# Patient Record
Sex: FEMALE | Race: WHITE | NOT HISPANIC OR LATINO | Employment: FULL TIME | URBAN - METROPOLITAN AREA
[De-identification: names, ages, dates, MRNs, and addresses within clinical notes are randomized per-mention and may not be internally consistent; named-entity substitution may affect disease eponyms.]

---

## 2023-04-18 ENCOUNTER — OFFICE VISIT (OUTPATIENT)
Dept: NEUROSURGERY | Facility: CLINIC | Age: 68
End: 2023-04-18
Payer: COMMERCIAL

## 2023-04-18 VITALS — WEIGHT: 180 LBS | TEMPERATURE: 97.8 F | BODY MASS INDEX: 31.89 KG/M2 | HEIGHT: 63 IN

## 2023-04-18 DIAGNOSIS — Z98.890 HISTORY OF LUMBAR SURGERY: Primary | ICD-10-CM

## 2023-04-18 DIAGNOSIS — M79.605 LEFT LEG PAIN: ICD-10-CM

## 2023-04-18 DIAGNOSIS — M51.36 DDD (DEGENERATIVE DISC DISEASE), LUMBAR: ICD-10-CM

## 2023-04-18 RX ORDER — ROSUVASTATIN CALCIUM 10 MG/1
TABLET, COATED ORAL
COMMUNITY
Start: 2023-04-14

## 2023-04-18 RX ORDER — LEVOTHYROXINE SODIUM 88 UG/1
1 TABLET ORAL DAILY
COMMUNITY
Start: 2023-04-02

## 2023-04-18 RX ORDER — LISINOPRIL AND HYDROCHLOROTHIAZIDE 20; 12.5 MG/1; MG/1
1 TABLET ORAL EVERY 12 HOURS SCHEDULED
COMMUNITY
Start: 2023-03-31

## 2023-04-18 RX ORDER — CHOLECALCIFEROL (VITAMIN D3) 1250 MCG
1 CAPSULE ORAL WEEKLY
COMMUNITY
Start: 2023-04-06

## 2023-04-18 RX ORDER — PREGABALIN 50 MG/1
50 CAPSULE ORAL NIGHTLY
COMMUNITY

## 2023-04-18 NOTE — PROGRESS NOTES
Patient: Refugio Bañuelos  : 1955    Primary Care Provider: Jelani Dahl MD    Requesting Provider: As above        History    Chief Complaint: Low back and left leg pain.    History of Present Illness: Ms. Bañuelos is a 67-year-old woman who works in [id number].   she underwent lumbar surgery by my former colleague Dr. Clarke.  She has had some occasional low back pain.  Since 2022 she has had a constant pain that begins in her left dorsal hip and buttock region and extends down the back of her left leg into the bottom of her left foot.  She saw Dr. Oropeza who suggested an epidural injection but never got back with her.  She saw Dr. Julian who did an epidural injection and that was not helpful.  He then suggested a MILD procedure.  He has not gotten back with her about that.  Remotely I operated on her mother.  Her leg symptoms are better when she sits down.  If she sits down too long then she does get some back pain.  She has some numbness in the toes of the left foot.  Occasionally she gets tingling in her right foot.  She has taken ibuprofen.  Gabapentin was poorly tolerated.    Review of Systems   Constitutional: Negative for activity change, appetite change, chills, diaphoresis, fatigue, fever and unexpected weight change.   HENT: Negative for congestion, dental problem, drooling, ear discharge, ear pain, facial swelling, hearing loss, mouth sores, nosebleeds, postnasal drip, rhinorrhea, sinus pressure, sneezing, sore throat, tinnitus, trouble swallowing and voice change.    Eyes: Negative for photophobia, pain, discharge, redness, itching and visual disturbance.   Respiratory: Negative for apnea, cough, choking, chest tightness, shortness of breath, wheezing and stridor.    Cardiovascular: Negative for chest pain, palpitations and leg swelling.   Gastrointestinal: Negative for abdominal distention, abdominal pain, anal bleeding, blood in stool, constipation, diarrhea, nausea, rectal  "pain and vomiting.   Endocrine: Negative for cold intolerance, heat intolerance, polydipsia, polyphagia and polyuria.   Genitourinary: Negative for decreased urine volume, difficulty urinating, dysuria, enuresis, flank pain, frequency, genital sores, hematuria and urgency.   Musculoskeletal: Positive for back pain. Negative for arthralgias, gait problem, joint swelling, myalgias, neck pain and neck stiffness.   Skin: Negative for color change, pallor, rash and wound.   Allergic/Immunologic: Negative for environmental allergies, food allergies and immunocompromised state.   Neurological: Negative for dizziness, tremors, seizures, syncope, facial asymmetry, speech difficulty, weakness, light-headedness, numbness and headaches.   Hematological: Negative for adenopathy. Does not bruise/bleed easily.   Psychiatric/Behavioral: Negative for agitation, behavioral problems, confusion, decreased concentration, dysphoric mood, hallucinations, self-injury, sleep disturbance and suicidal ideas. The patient is not nervous/anxious and is not hyperactive.    All other systems reviewed and are negative.      The patient's past medical history, past surgical history, family history, and social history have been reviewed at length in the electronic medical record.      Physical Exam:   Temp 97.8 °F (36.6 °C) (Infrared)   Ht 160 cm (63\")   Wt 81.6 kg (180 lb)   BMI 31.89 kg/m²   CONSTITUTIONAL: Patient is well-nourished, pleasant and appears stated age.  MUSCULOSKELETAL:  Straight leg raising is negative.  Nish's Sign is negative.  ROM in the low back is normal.  Tenderness in the back to palpation is not observed.  NEUROLOGICAL:  Orientation, memory, attention span, language function, and cognition have been examined and are intact.  Strength is intact in the lower extremities to direct testing.  Muscle tone is normal throughout.  Station and gait are normal.  Sensation is intact to light touch testing throughout.  Deep tendon " reflexes are 1+ and symmetrical.  Coordination is intact.      Medical Decision Making    Data Review:   (All imaging studies were personally reviewed unless stated otherwise)  MRI of the lumbar spine dated 9/10/2022 is a noncontrasted study.  There appears to be a laminectomy defect at L5-S1.  There may be some disc bulging at that level but it is hard to make out clearly.  There is a synovial cyst emanating from the right L4-5 facet joint.  Bilateral joint effusions are noted.    Diagnosis:   The patient has complaints that raise the specter of a left S1 radiculopathy but her MRI findings are more impressive at L4-5.    Treatment Options:   I am going to set up a lumbar CT myelogram with upright images to further assess.  Based on that further recommendations will ensue.       Diagnosis Plan   1. History of lumbar surgery        2. Left leg pain        3. DDD (degenerative disc disease), lumbar            Scribed for Edgar Mcguire MD by Jonel Tan CMA. 4/18/2023 12:51 EDT    I, Dr. Mcguire, personally performed the services described in the documentation, as scribed in my presence, and it is both accurate and complete.

## 2023-04-21 ENCOUNTER — TELEPHONE (OUTPATIENT)
Dept: NEUROSURGERY | Facility: CLINIC | Age: 68
End: 2023-04-21
Payer: COMMERCIAL

## 2023-04-21 DIAGNOSIS — Z98.890 HISTORY OF LUMBAR SURGERY: ICD-10-CM

## 2023-04-21 DIAGNOSIS — M79.605 LEFT LEG PAIN: Primary | ICD-10-CM

## 2023-04-21 NOTE — TELEPHONE ENCOUNTER
----- Message from Keila Srinivasan sent at 4/21/2023 11:36 AM EDT -----  Insurance requires 6 wks of PT prior to pt's myelogram.   Please place PT order and send to the pt's facility preference. Thanks

## 2023-04-21 NOTE — TELEPHONE ENCOUNTER
PT Order placed. I called and talked to the patient. She would like the order faxed to [facility] to see if they can get her in first. If not, patient is ok with anywhere in [place] that will accept her insurance.     [facility] is already closed for the day. They re-open on Monday morning @ 8:30am. # [phone]    I will attempt to call them Monday, and get the order faxed over.

## 2023-04-26 NOTE — TELEPHONE ENCOUNTER
PATIENT STATES [facility] HAVE NOT RECEIVED REFERRAL. PLEASE RE-FAX -[phone] AND CALL PATIENT [phone] TO UPDATE.

## 2023-06-21 PROBLEM — M48.062 LUMBAR STENOSIS WITH NEUROGENIC CLAUDICATION: Status: ACTIVE | Noted: 2023-06-21

## 2023-07-25 ENCOUNTER — ANESTHESIA EVENT (OUTPATIENT)
Dept: PERIOP | Facility: HOSPITAL | Age: 68
End: 2023-07-25
Payer: COMMERCIAL

## 2023-07-26 ENCOUNTER — APPOINTMENT (OUTPATIENT)
Dept: GENERAL RADIOLOGY | Facility: HOSPITAL | Age: 68
End: 2023-07-26
Payer: COMMERCIAL

## 2023-07-26 ENCOUNTER — ANESTHESIA (OUTPATIENT)
Dept: PERIOP | Facility: HOSPITAL | Age: 68
End: 2023-07-26
Payer: COMMERCIAL

## 2023-07-26 ENCOUNTER — HOSPITAL ENCOUNTER (OUTPATIENT)
Facility: HOSPITAL | Age: 68
LOS: 1 days | Discharge: HOME | End: 2023-07-28
Attending: NEUROLOGICAL SURGERY | Admitting: NEUROLOGICAL SURGERY
Payer: COMMERCIAL

## 2023-07-26 DIAGNOSIS — M48.062 LUMBAR STENOSIS WITH NEUROGENIC CLAUDICATION: ICD-10-CM

## 2023-07-26 PROCEDURE — 25010000002 DEXAMETHASONE PER 1 MG: Performed by: NURSE ANESTHETIST, CERTIFIED REGISTERED

## 2023-07-26 PROCEDURE — 25010000002 SUGAMMADEX 200 MG/2ML SOLUTION: Performed by: NURSE ANESTHETIST, CERTIFIED REGISTERED

## 2023-07-26 PROCEDURE — 25010000002 PROPOFOL 10 MG/ML EMULSION: Performed by: NURSE ANESTHETIST, CERTIFIED REGISTERED

## 2023-07-26 PROCEDURE — 63052 LAM FACETC/FRMT ARTHRD LUM 1: CPT | Performed by: NEUROLOGICAL SURGERY

## 2023-07-26 PROCEDURE — 25010000002 HYDROMORPHONE 1 MG/ML SOLUTION

## 2023-07-26 PROCEDURE — 76000 FLUOROSCOPY <1 HR PHYS/QHP: CPT

## 2023-07-26 PROCEDURE — 25010000002 ONDANSETRON PER 1 MG: Performed by: NURSE ANESTHETIST, CERTIFIED REGISTERED

## 2023-07-26 PROCEDURE — 22853 INSJ BIOMECHANICAL DEVICE: CPT | Performed by: NEUROLOGICAL SURGERY

## 2023-07-26 PROCEDURE — C1713 ANCHOR/SCREW BN/BN,TIS/BN: HCPCS | Performed by: NEUROLOGICAL SURGERY

## 2023-07-26 PROCEDURE — 25010000002 VANCOMYCIN 10 G RECONSTITUTED SOLUTION: Performed by: NEUROLOGICAL SURGERY

## 2023-07-26 PROCEDURE — S0260 H&P FOR SURGERY: HCPCS

## 2023-07-26 PROCEDURE — 22630 ARTHRD PST TQ 1NTRSPC LUM: CPT | Performed by: NEUROLOGICAL SURGERY

## 2023-07-26 PROCEDURE — 22840 INSERT SPINE FIXATION DEVICE: CPT | Performed by: PHYSICIAN ASSISTANT

## 2023-07-26 PROCEDURE — 25010000002 FENTANYL CITRATE (PF) 100 MCG/2ML SOLUTION: Performed by: NURSE ANESTHETIST, CERTIFIED REGISTERED

## 2023-07-26 PROCEDURE — 25010000002 FENTANYL CITRATE (PF) 50 MCG/ML SOLUTION

## 2023-07-26 PROCEDURE — 22840 INSERT SPINE FIXATION DEVICE: CPT | Performed by: NEUROLOGICAL SURGERY

## 2023-07-26 PROCEDURE — 63052 LAM FACETC/FRMT ARTHRD LUM 1: CPT | Performed by: PHYSICIAN ASSISTANT

## 2023-07-26 PROCEDURE — 61783 SCAN PROC SPINAL: CPT | Performed by: NEUROLOGICAL SURGERY

## 2023-07-26 PROCEDURE — 25010000002 VANCOMYCIN 1 G RECONSTITUTED SOLUTION: Performed by: NEUROLOGICAL SURGERY

## 2023-07-26 PROCEDURE — 63710000001 PROMETHAZINE PER 12.5 MG: Performed by: NEUROLOGICAL SURGERY

## 2023-07-26 PROCEDURE — 25010000002 ONDANSETRON PER 1 MG: Performed by: NEUROLOGICAL SURGERY

## 2023-07-26 PROCEDURE — 22630 ARTHRD PST TQ 1NTRSPC LUM: CPT | Performed by: PHYSICIAN ASSISTANT

## 2023-07-26 PROCEDURE — 22853 INSJ BIOMECHANICAL DEVICE: CPT | Performed by: PHYSICIAN ASSISTANT

## 2023-07-26 DEVICE — SPACER 84332409 ADAPTIX 24MM X 9MM
Type: IMPLANTABLE DEVICE | Site: SPINE LUMBAR | Status: FUNCTIONAL
Brand: ADAPTIX™ INTERBODY SYSTEM WITH TITAN NANOLOCK™ SURFACE TECHNOLOGY

## 2023-07-26 DEVICE — ROD 1555501035 5.5 CCM NS CURV 35MM
Type: IMPLANTABLE DEVICE | Site: SPINE LUMBAR | Status: FUNCTIONAL
Brand: CD HORIZON® SPINAL SYSTEM

## 2023-07-26 DEVICE — FLOSEAL HEMOSTATIC MATRIX, 10ML
Type: IMPLANTABLE DEVICE | Site: SPINE LUMBAR | Status: FUNCTIONAL
Brand: FLOSEAL HEMOSTATIC MATRIX

## 2023-07-26 DEVICE — HEMOST ABS SURGIFOAM SZ100 8X12 10MM: Type: IMPLANTABLE DEVICE | Site: SPINE LUMBAR | Status: FUNCTIONAL

## 2023-07-26 RX ORDER — SODIUM CHLORIDE 0.9 % (FLUSH) 0.9 %
3 SYRINGE (ML) INJECTION EVERY 12 HOURS SCHEDULED
Status: DISCONTINUED | OUTPATIENT
Start: 2023-07-26 | End: 2023-07-26 | Stop reason: HOSPADM

## 2023-07-26 RX ORDER — ONDANSETRON 2 MG/ML
INJECTION INTRAMUSCULAR; INTRAVENOUS AS NEEDED
Status: DISCONTINUED | OUTPATIENT
Start: 2023-07-26 | End: 2023-07-26 | Stop reason: SURG

## 2023-07-26 RX ORDER — MORPHINE SULFATE 4 MG/ML
5 INJECTION, SOLUTION INTRAMUSCULAR; INTRAVENOUS EVERY 4 HOURS PRN
Status: DISCONTINUED | OUTPATIENT
Start: 2023-07-26 | End: 2023-07-28 | Stop reason: HOSPADM

## 2023-07-26 RX ORDER — FAMOTIDINE 20 MG/1
20 TABLET, FILM COATED ORAL EVERY 12 HOURS SCHEDULED
Status: DISCONTINUED | OUTPATIENT
Start: 2023-07-26 | End: 2023-07-28 | Stop reason: HOSPADM

## 2023-07-26 RX ORDER — ROCURONIUM BROMIDE 10 MG/ML
INJECTION, SOLUTION INTRAVENOUS AS NEEDED
Status: DISCONTINUED | OUTPATIENT
Start: 2023-07-26 | End: 2023-07-26 | Stop reason: SURG

## 2023-07-26 RX ORDER — AMOXICILLIN 250 MG
2 CAPSULE ORAL NIGHTLY PRN
Status: DISCONTINUED | OUTPATIENT
Start: 2023-07-26 | End: 2023-07-28 | Stop reason: HOSPADM

## 2023-07-26 RX ORDER — NALOXONE HCL 0.4 MG/ML
0.4 VIAL (ML) INJECTION AS NEEDED
Status: DISCONTINUED | OUTPATIENT
Start: 2023-07-26 | End: 2023-07-26 | Stop reason: HOSPADM

## 2023-07-26 RX ORDER — SODIUM CHLORIDE 0.9 % (FLUSH) 0.9 %
10 SYRINGE (ML) INJECTION EVERY 12 HOURS SCHEDULED
Status: DISCONTINUED | OUTPATIENT
Start: 2023-07-26 | End: 2023-07-26 | Stop reason: HOSPADM

## 2023-07-26 RX ORDER — MAGNESIUM HYDROXIDE 1200 MG/15ML
LIQUID ORAL AS NEEDED
Status: DISCONTINUED | OUTPATIENT
Start: 2023-07-26 | End: 2023-07-26 | Stop reason: HOSPADM

## 2023-07-26 RX ORDER — LEVOTHYROXINE SODIUM 88 UG/1
88 TABLET ORAL
Status: DISCONTINUED | OUTPATIENT
Start: 2023-07-27 | End: 2023-07-28 | Stop reason: HOSPADM

## 2023-07-26 RX ORDER — FENTANYL CITRATE 50 UG/ML
INJECTION, SOLUTION INTRAMUSCULAR; INTRAVENOUS
Status: COMPLETED
Start: 2023-07-26 | End: 2023-07-26

## 2023-07-26 RX ORDER — MIDAZOLAM HYDROCHLORIDE 1 MG/ML
0.5 INJECTION INTRAMUSCULAR; INTRAVENOUS
Status: DISCONTINUED | OUTPATIENT
Start: 2023-07-26 | End: 2023-07-26 | Stop reason: HOSPADM

## 2023-07-26 RX ORDER — PROMETHAZINE HYDROCHLORIDE 25 MG/1
25 SUPPOSITORY RECTAL ONCE AS NEEDED
Status: DISCONTINUED | OUTPATIENT
Start: 2023-07-26 | End: 2023-07-26 | Stop reason: HOSPADM

## 2023-07-26 RX ORDER — MEPERIDINE HYDROCHLORIDE 25 MG/ML
12.5 INJECTION INTRAMUSCULAR; INTRAVENOUS; SUBCUTANEOUS
Status: DISCONTINUED | OUTPATIENT
Start: 2023-07-26 | End: 2023-07-26 | Stop reason: HOSPADM

## 2023-07-26 RX ORDER — SODIUM CHLORIDE 9 MG/ML
40 INJECTION, SOLUTION INTRAVENOUS AS NEEDED
Status: DISCONTINUED | OUTPATIENT
Start: 2023-07-26 | End: 2023-07-26 | Stop reason: HOSPADM

## 2023-07-26 RX ORDER — ROSUVASTATIN CALCIUM 10 MG/1
10 TABLET, COATED ORAL NIGHTLY
Status: DISCONTINUED | OUTPATIENT
Start: 2023-07-26 | End: 2023-07-28 | Stop reason: HOSPADM

## 2023-07-26 RX ORDER — ACETAMINOPHEN 500 MG
1000 TABLET ORAL ONCE
Status: COMPLETED | OUTPATIENT
Start: 2023-07-26 | End: 2023-07-26

## 2023-07-26 RX ORDER — LIDOCAINE HYDROCHLORIDE 10 MG/ML
0.5 INJECTION, SOLUTION EPIDURAL; INFILTRATION; INTRACAUDAL; PERINEURAL ONCE AS NEEDED
Status: COMPLETED | OUTPATIENT
Start: 2023-07-26 | End: 2023-07-26

## 2023-07-26 RX ORDER — FAMOTIDINE 20 MG/1
20 TABLET, FILM COATED ORAL
Status: COMPLETED | OUTPATIENT
Start: 2023-07-26 | End: 2023-07-26

## 2023-07-26 RX ORDER — BISACODYL 10 MG
10 SUPPOSITORY, RECTAL RECTAL DAILY PRN
Status: DISCONTINUED | OUTPATIENT
Start: 2023-07-26 | End: 2023-07-28 | Stop reason: HOSPADM

## 2023-07-26 RX ORDER — BUPIVACAINE HYDROCHLORIDE AND EPINEPHRINE 2.5; 5 MG/ML; UG/ML
INJECTION, SOLUTION EPIDURAL; INFILTRATION; INTRACAUDAL; PERINEURAL AS NEEDED
Status: DISCONTINUED | OUTPATIENT
Start: 2023-07-26 | End: 2023-07-26 | Stop reason: HOSPADM

## 2023-07-26 RX ORDER — HYDRALAZINE HYDROCHLORIDE 20 MG/ML
5 INJECTION INTRAMUSCULAR; INTRAVENOUS
Status: DISCONTINUED | OUTPATIENT
Start: 2023-07-26 | End: 2023-07-26 | Stop reason: HOSPADM

## 2023-07-26 RX ORDER — SODIUM CHLORIDE, SODIUM LACTATE, POTASSIUM CHLORIDE, CALCIUM CHLORIDE 600; 310; 30; 20 MG/100ML; MG/100ML; MG/100ML; MG/100ML
9 INJECTION, SOLUTION INTRAVENOUS CONTINUOUS
Status: DISCONTINUED | OUTPATIENT
Start: 2023-07-26 | End: 2023-07-28 | Stop reason: HOSPADM

## 2023-07-26 RX ORDER — FENTANYL CITRATE 50 UG/ML
INJECTION, SOLUTION INTRAMUSCULAR; INTRAVENOUS AS NEEDED
Status: DISCONTINUED | OUTPATIENT
Start: 2023-07-26 | End: 2023-07-26 | Stop reason: SURG

## 2023-07-26 RX ORDER — ACETAMINOPHEN 325 MG/1
650 TABLET ORAL 3 TIMES DAILY
Status: DISCONTINUED | OUTPATIENT
Start: 2023-07-26 | End: 2023-07-28 | Stop reason: HOSPADM

## 2023-07-26 RX ORDER — IBUPROFEN 800 MG/1
800 TABLET ORAL ONCE
Status: COMPLETED | OUTPATIENT
Start: 2023-07-26 | End: 2023-07-26

## 2023-07-26 RX ORDER — SODIUM CHLORIDE 0.9 % (FLUSH) 0.9 %
10 SYRINGE (ML) INJECTION AS NEEDED
Status: DISCONTINUED | OUTPATIENT
Start: 2023-07-26 | End: 2023-07-28 | Stop reason: HOSPADM

## 2023-07-26 RX ORDER — HYDROCHLOROTHIAZIDE 12.5 MG/1
12.5 TABLET ORAL
Status: DISCONTINUED | OUTPATIENT
Start: 2023-07-27 | End: 2023-07-28 | Stop reason: HOSPADM

## 2023-07-26 RX ORDER — LIDOCAINE HYDROCHLORIDE 10 MG/ML
INJECTION, SOLUTION EPIDURAL; INFILTRATION; INTRACAUDAL; PERINEURAL AS NEEDED
Status: DISCONTINUED | OUTPATIENT
Start: 2023-07-26 | End: 2023-07-26 | Stop reason: SURG

## 2023-07-26 RX ORDER — DROPERIDOL 2.5 MG/ML
0.62 INJECTION, SOLUTION INTRAMUSCULAR; INTRAVENOUS ONCE AS NEEDED
Status: DISCONTINUED | OUTPATIENT
Start: 2023-07-26 | End: 2023-07-26 | Stop reason: HOSPADM

## 2023-07-26 RX ORDER — HYDROMORPHONE HYDROCHLORIDE 1 MG/ML
0.5 INJECTION, SOLUTION INTRAMUSCULAR; INTRAVENOUS; SUBCUTANEOUS
Status: DISCONTINUED | OUTPATIENT
Start: 2023-07-26 | End: 2023-07-26 | Stop reason: HOSPADM

## 2023-07-26 RX ORDER — FENTANYL CITRATE 50 UG/ML
50 INJECTION, SOLUTION INTRAMUSCULAR; INTRAVENOUS
Status: DISCONTINUED | OUTPATIENT
Start: 2023-07-26 | End: 2023-07-26 | Stop reason: HOSPADM

## 2023-07-26 RX ORDER — PROMETHAZINE HYDROCHLORIDE 12.5 MG/1
12.5 SUPPOSITORY RECTAL EVERY 6 HOURS PRN
Status: DISCONTINUED | OUTPATIENT
Start: 2023-07-26 | End: 2023-07-28 | Stop reason: HOSPADM

## 2023-07-26 RX ORDER — PROPOFOL 10 MG/ML
VIAL (ML) INTRAVENOUS AS NEEDED
Status: DISCONTINUED | OUTPATIENT
Start: 2023-07-26 | End: 2023-07-26 | Stop reason: SURG

## 2023-07-26 RX ORDER — EPHEDRINE SULFATE 50 MG/ML
INJECTION, SOLUTION INTRAVENOUS AS NEEDED
Status: DISCONTINUED | OUTPATIENT
Start: 2023-07-26 | End: 2023-07-26 | Stop reason: SURG

## 2023-07-26 RX ORDER — OXYCODONE HCL 10 MG/1
10 TABLET, FILM COATED, EXTENDED RELEASE ORAL ONCE
Status: COMPLETED | OUTPATIENT
Start: 2023-07-26 | End: 2023-07-26

## 2023-07-26 RX ORDER — PHENYLEPHRINE HCL IN 0.9% NACL 1 MG/10 ML
SYRINGE (ML) INTRAVENOUS AS NEEDED
Status: DISCONTINUED | OUTPATIENT
Start: 2023-07-26 | End: 2023-07-26 | Stop reason: SURG

## 2023-07-26 RX ORDER — SODIUM CHLORIDE 0.9 % (FLUSH) 0.9 %
10 SYRINGE (ML) INJECTION EVERY 12 HOURS SCHEDULED
Status: DISCONTINUED | OUTPATIENT
Start: 2023-07-26 | End: 2023-07-28 | Stop reason: HOSPADM

## 2023-07-26 RX ORDER — LISINOPRIL 10 MG/1
10 TABLET ORAL
Status: DISCONTINUED | OUTPATIENT
Start: 2023-07-27 | End: 2023-07-28 | Stop reason: HOSPADM

## 2023-07-26 RX ORDER — DEXMEDETOMIDINE HYDROCHLORIDE 100 UG/ML
INJECTION, SOLUTION INTRAVENOUS AS NEEDED
Status: DISCONTINUED | OUTPATIENT
Start: 2023-07-26 | End: 2023-07-26 | Stop reason: SURG

## 2023-07-26 RX ORDER — OXYCODONE AND ACETAMINOPHEN 7.5; 325 MG/1; MG/1
1 TABLET ORAL EVERY 4 HOURS PRN
Status: DISCONTINUED | OUTPATIENT
Start: 2023-07-26 | End: 2023-07-28 | Stop reason: HOSPADM

## 2023-07-26 RX ORDER — DOCUSATE SODIUM 100 MG/1
100 CAPSULE, LIQUID FILLED ORAL 2 TIMES DAILY PRN
Status: DISCONTINUED | OUTPATIENT
Start: 2023-07-26 | End: 2023-07-28 | Stop reason: HOSPADM

## 2023-07-26 RX ORDER — DROPERIDOL 2.5 MG/ML
0.62 INJECTION, SOLUTION INTRAMUSCULAR; INTRAVENOUS
Status: DISCONTINUED | OUTPATIENT
Start: 2023-07-26 | End: 2023-07-26 | Stop reason: HOSPADM

## 2023-07-26 RX ORDER — DEXAMETHASONE SODIUM PHOSPHATE 4 MG/ML
INJECTION, SOLUTION INTRA-ARTICULAR; INTRALESIONAL; INTRAMUSCULAR; INTRAVENOUS; SOFT TISSUE AS NEEDED
Status: DISCONTINUED | OUTPATIENT
Start: 2023-07-26 | End: 2023-07-26 | Stop reason: SURG

## 2023-07-26 RX ORDER — ONDANSETRON 4 MG/1
4 TABLET, FILM COATED ORAL EVERY 6 HOURS PRN
Status: DISCONTINUED | OUTPATIENT
Start: 2023-07-26 | End: 2023-07-28 | Stop reason: HOSPADM

## 2023-07-26 RX ORDER — MORPHINE SULFATE 2 MG/ML
2 INJECTION, SOLUTION INTRAMUSCULAR; INTRAVENOUS EVERY 4 HOURS PRN
Status: DISCONTINUED | OUTPATIENT
Start: 2023-07-26 | End: 2023-07-28 | Stop reason: HOSPADM

## 2023-07-26 RX ORDER — FAMOTIDINE 10 MG/ML
20 INJECTION, SOLUTION INTRAVENOUS EVERY 12 HOURS SCHEDULED
Status: DISCONTINUED | OUTPATIENT
Start: 2023-07-26 | End: 2023-07-28 | Stop reason: HOSPADM

## 2023-07-26 RX ORDER — ONDANSETRON 2 MG/ML
4 INJECTION INTRAMUSCULAR; INTRAVENOUS ONCE AS NEEDED
Status: DISCONTINUED | OUTPATIENT
Start: 2023-07-26 | End: 2023-07-26 | Stop reason: HOSPADM

## 2023-07-26 RX ORDER — ONDANSETRON 2 MG/ML
4 INJECTION INTRAMUSCULAR; INTRAVENOUS EVERY 6 HOURS PRN
Status: DISCONTINUED | OUTPATIENT
Start: 2023-07-26 | End: 2023-07-28 | Stop reason: HOSPADM

## 2023-07-26 RX ORDER — DIPHENHYDRAMINE HCL 25 MG
25 CAPSULE ORAL NIGHTLY PRN
Status: DISCONTINUED | OUTPATIENT
Start: 2023-07-26 | End: 2023-07-28 | Stop reason: HOSPADM

## 2023-07-26 RX ORDER — SODIUM CHLORIDE 0.9 % (FLUSH) 0.9 %
3-10 SYRINGE (ML) INJECTION AS NEEDED
Status: DISCONTINUED | OUTPATIENT
Start: 2023-07-26 | End: 2023-07-26 | Stop reason: HOSPADM

## 2023-07-26 RX ORDER — SODIUM CHLORIDE, SODIUM LACTATE, POTASSIUM CHLORIDE, CALCIUM CHLORIDE 600; 310; 30; 20 MG/100ML; MG/100ML; MG/100ML; MG/100ML
90 INJECTION, SOLUTION INTRAVENOUS CONTINUOUS
Status: DISCONTINUED | OUTPATIENT
Start: 2023-07-26 | End: 2023-07-27

## 2023-07-26 RX ORDER — PROMETHAZINE HYDROCHLORIDE 12.5 MG/1
12.5 TABLET ORAL EVERY 6 HOURS PRN
Status: DISCONTINUED | OUTPATIENT
Start: 2023-07-26 | End: 2023-07-28 | Stop reason: HOSPADM

## 2023-07-26 RX ORDER — VANCOMYCIN HYDROCHLORIDE 1 G/20ML
INJECTION, POWDER, LYOPHILIZED, FOR SOLUTION INTRAVENOUS AS NEEDED
Status: DISCONTINUED | OUTPATIENT
Start: 2023-07-26 | End: 2023-07-26 | Stop reason: HOSPADM

## 2023-07-26 RX ORDER — NALOXONE HCL 0.4 MG/ML
0.4 VIAL (ML) INJECTION
Status: DISCONTINUED | OUTPATIENT
Start: 2023-07-26 | End: 2023-07-28 | Stop reason: HOSPADM

## 2023-07-26 RX ORDER — SODIUM CHLORIDE 9 MG/ML
INJECTION, SOLUTION INTRAVENOUS AS NEEDED
Status: DISCONTINUED | OUTPATIENT
Start: 2023-07-26 | End: 2023-07-26 | Stop reason: HOSPADM

## 2023-07-26 RX ORDER — PROMETHAZINE HYDROCHLORIDE 25 MG/1
25 TABLET ORAL ONCE AS NEEDED
Status: DISCONTINUED | OUTPATIENT
Start: 2023-07-26 | End: 2023-07-26 | Stop reason: HOSPADM

## 2023-07-26 RX ORDER — LABETALOL HYDROCHLORIDE 5 MG/ML
5 INJECTION, SOLUTION INTRAVENOUS
Status: DISCONTINUED | OUTPATIENT
Start: 2023-07-26 | End: 2023-07-26 | Stop reason: HOSPADM

## 2023-07-26 RX ORDER — IPRATROPIUM BROMIDE AND ALBUTEROL SULFATE 2.5; .5 MG/3ML; MG/3ML
3 SOLUTION RESPIRATORY (INHALATION) ONCE AS NEEDED
Status: DISCONTINUED | OUTPATIENT
Start: 2023-07-26 | End: 2023-07-26 | Stop reason: HOSPADM

## 2023-07-26 RX ORDER — SODIUM CHLORIDE 0.9 % (FLUSH) 0.9 %
10 SYRINGE (ML) INJECTION AS NEEDED
Status: DISCONTINUED | OUTPATIENT
Start: 2023-07-26 | End: 2023-07-26 | Stop reason: HOSPADM

## 2023-07-26 RX ORDER — HYDROCODONE BITARTRATE AND ACETAMINOPHEN 5; 325 MG/1; MG/1
1 TABLET ORAL ONCE AS NEEDED
Status: DISCONTINUED | OUTPATIENT
Start: 2023-07-26 | End: 2023-07-26 | Stop reason: HOSPADM

## 2023-07-26 RX ORDER — IBUPROFEN 600 MG/1
600 TABLET ORAL 3 TIMES DAILY
Status: DISCONTINUED | OUTPATIENT
Start: 2023-07-26 | End: 2023-07-28 | Stop reason: HOSPADM

## 2023-07-26 RX ORDER — SODIUM CHLORIDE 9 MG/ML
40 INJECTION, SOLUTION INTRAVENOUS AS NEEDED
Status: DISCONTINUED | OUTPATIENT
Start: 2023-07-26 | End: 2023-07-28 | Stop reason: HOSPADM

## 2023-07-26 RX ADMIN — FENTANYL CITRATE 50 MCG: 50 INJECTION, SOLUTION INTRAMUSCULAR; INTRAVENOUS at 09:20

## 2023-07-26 RX ADMIN — FAMOTIDINE 20 MG: 20 TABLET ORAL at 07:55

## 2023-07-26 RX ADMIN — ROSUVASTATIN 10 MG: 10 TABLET, FILM COATED ORAL at 20:35

## 2023-07-26 RX ADMIN — LIDOCAINE HYDROCHLORIDE 0.5 ML: 10 INJECTION, SOLUTION EPIDURAL; INFILTRATION; INTRACAUDAL; PERINEURAL at 07:54

## 2023-07-26 RX ADMIN — EPHEDRINE SULFATE 5 MG: 50 INJECTION INTRAVENOUS at 10:49

## 2023-07-26 RX ADMIN — EPHEDRINE SULFATE 10 MG: 50 INJECTION INTRAVENOUS at 09:54

## 2023-07-26 RX ADMIN — FENTANYL CITRATE 25 MCG: 50 INJECTION, SOLUTION INTRAMUSCULAR; INTRAVENOUS at 10:09

## 2023-07-26 RX ADMIN — SUGAMMADEX 200 MG: 100 INJECTION, SOLUTION INTRAVENOUS at 12:07

## 2023-07-26 RX ADMIN — IBUPROFEN 600 MG: 600 TABLET ORAL at 20:35

## 2023-07-26 RX ADMIN — DEXAMETHASONE SODIUM PHOSPHATE 4 MG: 4 INJECTION, SOLUTION INTRAMUSCULAR; INTRAVENOUS at 09:21

## 2023-07-26 RX ADMIN — DEXMEDETOMIDINE HYDROCHLORIDE 8 MCG: 100 INJECTION, SOLUTION INTRAVENOUS at 12:10

## 2023-07-26 RX ADMIN — OXYCODONE HYDROCHLORIDE 10 MG: 10 TABLET, FILM COATED, EXTENDED RELEASE ORAL at 07:55

## 2023-07-26 RX ADMIN — ROCURONIUM BROMIDE 20 MG: 10 SOLUTION INTRAVENOUS at 10:22

## 2023-07-26 RX ADMIN — Medication 100 MCG: at 11:03

## 2023-07-26 RX ADMIN — SODIUM CHLORIDE, POTASSIUM CHLORIDE, SODIUM LACTATE AND CALCIUM CHLORIDE 9 ML/HR: 600; 310; 30; 20 INJECTION, SOLUTION INTRAVENOUS at 07:54

## 2023-07-26 RX ADMIN — ROCURONIUM BROMIDE 10 MG: 10 SOLUTION INTRAVENOUS at 11:52

## 2023-07-26 RX ADMIN — PROMETHAZINE HYDROCHLORIDE 12.5 MG: 12.5 TABLET ORAL at 18:29

## 2023-07-26 RX ADMIN — ROCURONIUM BROMIDE 20 MG: 10 SOLUTION INTRAVENOUS at 10:56

## 2023-07-26 RX ADMIN — Medication 100 MCG: at 10:32

## 2023-07-26 RX ADMIN — EPHEDRINE SULFATE 10 MG: 50 INJECTION INTRAVENOUS at 10:04

## 2023-07-26 RX ADMIN — FENTANYL CITRATE 50 MCG: 50 INJECTION, SOLUTION INTRAMUSCULAR; INTRAVENOUS at 13:25

## 2023-07-26 RX ADMIN — ACETAMINOPHEN 1000 MG: 500 TABLET ORAL at 07:55

## 2023-07-26 RX ADMIN — FAMOTIDINE 20 MG: 20 TABLET, FILM COATED ORAL at 20:35

## 2023-07-26 RX ADMIN — Medication 200 MCG: at 09:33

## 2023-07-26 RX ADMIN — PROPOFOL 150 MG: 10 INJECTION, EMULSION INTRAVENOUS at 09:20

## 2023-07-26 RX ADMIN — DOCUSATE SODIUM 100 MG: 100 CAPSULE, LIQUID FILLED ORAL at 16:53

## 2023-07-26 RX ADMIN — VANCOMYCIN HYDROCHLORIDE 1250 MG: 10 INJECTION, POWDER, LYOPHILIZED, FOR SOLUTION INTRAVENOUS at 09:05

## 2023-07-26 RX ADMIN — Medication 100 MCG: at 11:51

## 2023-07-26 RX ADMIN — Medication 200 MCG: at 09:36

## 2023-07-26 RX ADMIN — DEXMEDETOMIDINE HYDROCHLORIDE 8 MCG: 100 INJECTION, SOLUTION INTRAVENOUS at 12:28

## 2023-07-26 RX ADMIN — HYDROMORPHONE HYDROCHLORIDE 0.5 MG: 1 INJECTION, SOLUTION INTRAMUSCULAR; INTRAVENOUS; SUBCUTANEOUS at 13:40

## 2023-07-26 RX ADMIN — FENTANYL CITRATE 25 MCG: 50 INJECTION, SOLUTION INTRAMUSCULAR; INTRAVENOUS at 10:00

## 2023-07-26 RX ADMIN — IBUPROFEN 600 MG: 600 TABLET ORAL at 16:53

## 2023-07-26 RX ADMIN — Medication 100 MCG: at 10:49

## 2023-07-26 RX ADMIN — ROCURONIUM BROMIDE 10 MG: 10 SOLUTION INTRAVENOUS at 09:54

## 2023-07-26 RX ADMIN — ROCURONIUM BROMIDE 10 MG: 10 SOLUTION INTRAVENOUS at 11:27

## 2023-07-26 RX ADMIN — DEXMEDETOMIDINE HYDROCHLORIDE 4 MCG: 100 INJECTION, SOLUTION INTRAVENOUS at 10:23

## 2023-07-26 RX ADMIN — ACETAMINOPHEN 650 MG: 325 TABLET ORAL at 20:35

## 2023-07-26 RX ADMIN — Medication 100 MCG: at 10:04

## 2023-07-26 RX ADMIN — EPHEDRINE SULFATE 10 MG: 50 INJECTION INTRAVENOUS at 09:41

## 2023-07-26 RX ADMIN — PROPOFOL 25 MCG/KG/MIN: 10 INJECTION, EMULSION INTRAVENOUS at 09:40

## 2023-07-26 RX ADMIN — LIDOCAINE HYDROCHLORIDE 100 MG: 10 INJECTION, SOLUTION EPIDURAL; INFILTRATION; INTRACAUDAL; PERINEURAL at 09:20

## 2023-07-26 RX ADMIN — OXYCODONE HYDROCHLORIDE AND ACETAMINOPHEN 1 TABLET: 7.5; 325 TABLET ORAL at 16:53

## 2023-07-26 RX ADMIN — IBUPROFEN 800 MG: 800 TABLET, FILM COATED ORAL at 07:55

## 2023-07-26 RX ADMIN — ROCURONIUM BROMIDE 50 MG: 10 SOLUTION INTRAVENOUS at 09:21

## 2023-07-26 RX ADMIN — ACETAMINOPHEN 650 MG: 325 TABLET ORAL at 16:53

## 2023-07-26 RX ADMIN — VANCOMYCIN HYDROCHLORIDE 1250 MG: 10 INJECTION, POWDER, LYOPHILIZED, FOR SOLUTION INTRAVENOUS at 20:35

## 2023-07-26 RX ADMIN — EPHEDRINE SULFATE 5 MG: 50 INJECTION INTRAVENOUS at 10:32

## 2023-07-26 RX ADMIN — Medication 100 MCG: at 10:15

## 2023-07-26 RX ADMIN — EPHEDRINE SULFATE 10 MG: 50 INJECTION INTRAVENOUS at 11:03

## 2023-07-26 RX ADMIN — MUPIROCIN 1 APPLICATION: 20 OINTMENT TOPICAL at 08:00

## 2023-07-26 RX ADMIN — ONDANSETRON 4 MG: 2 INJECTION INTRAMUSCULAR; INTRAVENOUS at 11:54

## 2023-07-26 RX ADMIN — ONDANSETRON 4 MG: 2 INJECTION INTRAMUSCULAR; INTRAVENOUS at 15:29

## 2023-07-26 RX ADMIN — Medication 100 MCG: at 11:34

## 2023-07-26 RX ADMIN — Medication 100 MCG: at 11:24

## 2023-07-26 RX ADMIN — DEXMEDETOMIDINE HYDROCHLORIDE 4 MCG: 100 INJECTION, SOLUTION INTRAVENOUS at 12:01

## 2023-07-26 NOTE — ANESTHESIA PROCEDURE NOTES
Airway  Urgency: elective    Date/Time: 7/26/2023 9:23 AM  Airway not difficult    General Information and Staff    Patient location during procedure: OR  CRNA/CAA: Nithya Silva CRNA    Indications and Patient Condition  Indications for airway management: airway protection    Preoxygenated: yes  MILS not maintained throughout  Mask difficulty assessment: 1 - vent by mask    Final Airway Details  Final airway type: endotracheal airway      Successful airway: ETT  Cuffed: yes   Successful intubation technique: video laryngoscopy  Facilitating devices/methods: intubating stylet  Endotracheal tube insertion site: oral  Blade: Robles  Blade size: 3  ETT size (mm): 7.0  Cormack-Lehane Classification: grade I - full view of glottis  Placement verified by: chest auscultation and capnometry   Measured from: lips  ETT/EBT  to lips (cm): 20  Number of attempts at approach: 1  Assessment: lips, teeth, and gum same as pre-op and atraumatic intubation    Additional Comments  Negative epigastric sounds, Breath sound equal bilaterally with symmetric chest rise and fall

## 2023-07-26 NOTE — ANESTHESIA PREPROCEDURE EVALUATION
Anesthesia Evaluation     Patient summary reviewed and Nursing notes reviewed   history of anesthetic complications:  PONV  NPO Solid Status: > 8 hours  NPO Liquid Status: > 2 hours           Airway   Mallampati: II  TM distance: >3 FB  Neck ROM: full  Possible difficult intubation  Dental - normal exam     Pulmonary - normal exam   (+) a smoker Current,  (-) sleep apnea, no home oxygen  Cardiovascular - normal exam  Exercise tolerance: good (4-7 METS)    ECG reviewed    (+) hypertensionhyperlipidemia  (-) valvular problems/murmurs, dysrhythmias, angina, CHF, cardiac stents      Neuro/Psych  (-) seizures, TIA  GI/Hepatic/Renal/Endo    (+) thyroid problem hypothyroidism  (-) GERD, liver disease, no renal disease, diabetes    Musculoskeletal     Abdominal    Substance History - negative use     OB/GYN          Other        ROS/Med Hx Other: Hgb    12.2 k 4.0                   Anesthesia Plan    ASA 2     general     (Risks and benefits of general anesthesia discussed with patient (including MI, CVA, death, recall, aspiration, oropharyngeal/dental damage), questions answered, agreeable to proceed.    )  intravenous induction     Anesthetic plan, risks, benefits, and alternatives have been provided, discussed and informed consent has been obtained with: patient.    Use of blood products discussed with patient  Consented to blood products.    Plan discussed with CRNA.    CODE STATUS:

## 2023-07-26 NOTE — H&P
Pre-Op H&P  Refugio Bañuelos  [id number]  1955      Chief complaint: Low Back Pain      Subjective:  Patient is a 68 y.o.female presents for scheduled surgery by Dr. Mcguire. She anticipates a LUMBAR FUSION DECOMPRESSON WITH PEDICLE SCREWS L4-5 today.  The patient endorses having lower back pain that started September of 2022.  She also endorses bilateral leg pain, but the left leg is worse than the right.  The symptoms are present regardless of movement.  She endorses tingling in her left toes as well.  She denies using a walker or assistive device.  None of the medications she has been taking have helped to alleviate her symptoms.    Review of Systems:  Constitutional-- No fever, chills or sweats. No fatigue.  CV-- No chest pain, palpitation or syncope. +HTN, HLD.  Resp-- No SOB, cough, hemoptysis  Skin--No rashes or lesions      Allergies:   Allergies   Allergen Reactions    Penicillins Swelling         Home Meds:  Medications Prior to Admission   Medication Sig Dispense Refill Last Dose    acetaminophen (TYLENOL) 650 MG 8 hr tablet Take 1 tablet by mouth Every 8 (Eight) Hours As Needed for Mild Pain.   7/25/2023    chlorhexidine (HIBICLENS) 4 % external liquid Shower each day with solution for 5 days beginning 5 days before surgery. 120 mL 0 7/25/2023    Cholecalciferol (Vitamin D3) 1.25 MG (90793 UT) capsule Take 1 capsule by mouth 1 (One) Time Per Week.   Past Month    levothyroxine (SYNTHROID, LEVOTHROID) 88 MCG tablet Take 1 tablet by mouth Daily.   7/26/2023    lisinopril-hydrochlorothiazide (PRINZIDE,ZESTORETIC) 20-12.5 MG per tablet Take 1 tablet by mouth Every 12 (Twelve) Hours.   7/25/2023    multivitamin with minerals tablet tablet Take 1 tablet by mouth Daily.   7/25/2023    mupirocin (BACTROBAN) 2 % nasal ointment Apply to the inside of each nostril with a cotton swab two times daily, morning and evening, for 5 days before surgery. 10 each 0 7/25/2023    naproxen sodium (ALEVE) 220 MG tablet  "Take 1 tablet by mouth 2 (Two) Times a Day As Needed.   Past Week    rosuvastatin (CRESTOR) 10 MG tablet Take 1 tablet by mouth Every Night.   2023         PMH:   Past Medical History:   Diagnosis Date    Arthritis     Elevated cholesterol     Hypertension     Low back pain     PONV (postoperative nausea and vomiting)     Spinal headache     Thyroid disease      PSH:    Past Surgical History:   Procedure Laterality Date    APPENDECTOMY  1982    CARPAL TUNNEL RELEASE Bilateral      SECTION      COLONOSCOPY  2023    22 polyps removed per pt    LAPAROSCOPIC TUBAL LIGATION      LUMBAR SPINE SURGERY      L5/S1 decompression, Dr. Clarke [facility]       Immunization History:  Influenza: Yes  Pneumococcal: Yes  Tetanus: Unsure  Covid : x5    Social History:   Tobacco:   Social History     Tobacco Use   Smoking Status Every Day    Packs/day: 0.50    Years: 20.00    Pack years: 10.00    Types: Cigarettes   Smokeless Tobacco Never      Alcohol:     Social History     Substance and Sexual Activity   Alcohol Use Never         Physical Exam:/62 (BP Location: Right arm, Patient Position: Lying)   Pulse 66   Temp 97.4 °F (36.3 °C) (Temporal)   Resp 18   Ht 160 cm (63\")   Wt 83.5 kg (184 lb)   SpO2 97%   BMI 32.59 kg/m²       General Appearance:    Alert, cooperative, no distress, appears stated age   Head:    Normocephalic, without obvious abnormality, atraumatic   Lungs:     Clear to auscultation bilaterally, respirations unlabored    Heart:   Regular rate and rhythm, S1 and S2 normal    Abdomen:    Soft without tenderness   Extremities:   Extremities normal, atraumatic, no cyanosis or edema   Skin:   Skin color, texture, turgor normal, no rashes or lesions   Neurologic:   Grossly intact     Results Review:     LABS:  Lab Results   Component Value Date    WBC 9.63 2023    HGB 12.2 2023    HCT 36.8 2023    MCV 95.8 2023     2023    K 4.0 2023 "       RADIOLOGY:  Imaging Results (Last 72 Hours)       ** No results found for the last 72 hours. **            I reviewed the patient's new clinical results.    Impression: Lumbar stenosis with neurogenic claudication       Plan: LUMBAR FUSION DECOMPRESSON WITH PEDICLE SCREWS L4-5       Nathan Ayala, RHIANNA   7/26/2023   08:26 EDT

## 2023-07-26 NOTE — LETTER
[facility]    Patient: Refugio Bañuelos  : 1955      Dear Jelani,    I just completed the lumbar decompression with fusion and stabilization on Ms. Bañuelos to address her spondylolisthesis, stenosis, and instability.  Thus far all has gone well.  She will likely be hospitalized for 2-3 days.      With kindest regards,    Edgar Mcguire MD  23  12:20 EDT

## 2023-07-26 NOTE — ANESTHESIA POSTPROCEDURE EVALUATION
Patient: Refugio Bañuelos    Procedure Summary       Date: 07/26/23 [facility] / [facility]:  [facility]    Anesthesia Start: 0916 Anesthesia Stop: 1231    Procedure: LUMBAR FUSION DECOMPRESSON WITH PEDICLE SCREWS L4-5 (Spine Lumbar) Diagnosis:       Lumbar stenosis with neurogenic claudication      (Lumbar stenosis with neurogenic claudication [M48.062])    Surgeons: Edgar Mcguire MD Provider: Ofe Castillo DO    Anesthesia Type: general ASA Status: 2            Anesthesia Type: general    Vitals  Vitals Value Taken Time   /54 07/26/23 1230   Temp     Pulse 95 07/26/23 1231   Resp     SpO2 98 % 07/26/23 1231   Vitals shown include unvalidated device data.        Post Anesthesia Care and Evaluation    Patient location during evaluation: PACU  Patient participation: complete - patient participated  Pain management: adequate    Airway patency: patent  Anesthetic complications: No anesthetic complications  PONV Status: none  Cardiovascular status: hemodynamically stable and acceptable  Respiratory status: nonlabored ventilation, acceptable and nasal cannula  Hydration status: acceptable

## 2023-07-26 NOTE — PLAN OF CARE
"Patient arrived at 14:11 from PACU.  VSS.  HV intact with 90ml since arrival for a total of 150 since insertion.  Treated  with prn medication for nausea with active vomiting.  Demonstrated safe log roll but was not \"well\" enough to tolerate ambulation.  Dressing remained CDI.  Incontinent urine noted with vomiting.  Complete bed change required.  Patient agreed to ambulate when nausea subsided after phenergan.                          "

## 2023-07-26 NOTE — OP NOTE
NEUROSURGICAL OPERATIVE NOTE        PREOPERATIVE DIAGNOSIS:    L4-5 spondylolisthesis, stenosis, and instability      POSTOPERATIVE DIAGNOSIS:  Same      PROCEDURE:  1.  Arthrodesis interbody type L4-5  2.  L4-5 laminectomy with partial facetectomies and foraminotomies  3.  Bilateral L4-5 discectomy with bilateral Adaptix cage placement  4.  Nonsegmental pedicle screw fixation L4-5 utilizing Solera 5.5  5.  Use of Amelia and local autograft  6.  Stealth frameless stereotaxy utilized in conjunction with O arm imaging      SURGEON:  Edgar Mcguire M.D.      ASSISTANT: Melba Martinez PA-C    PAC assisted with:   Suctioning   Retraction   Tying   Suturing   Closing   Application of dressing   Skilled neurosurgery PA assistance was necessary to perform this procedure.        ANESTHESIA:  General      ESTIMATED BLOOD LOSS: 200 mL      SPECIMEN: None      DRAINS: Hemovac      COMPLICATIONS:  None      Spinal Surgery Levels Completed:1 Level        CLINICAL NOTE:  The patient is a 68-year-old woman with progressive back and lower extremity pain.  Studies demonstrate high-grade stenosis with spondylolisthesis and migue significant instability.  As such, she presents at this time for L4-5 decompression with fusion and stabilization.  The nature of the procedure as well as the potential risks, complications, limitations, and alternatives to the procedure were discussed at length with the patient and the patient has agreed to proceed with surgery.      TECHNICAL NOTE:  Patient was brought to the operating room and while on her cart, general endotracheal anesthesia was achieved. She was then turned prone onto the Andreas table. Special care was ensured to protect pressure points. Her low back was prepared and draped in the usual fashion. A localizing radiograph was obtained with the spinal needle in the lumbosacral midline using C-Arm. Based on this, a several centimeter vertical incision was fashioned overlying the L4-L5  "level. Underlying tissues were divided with cautery to provide exposure to the posterior spinal elements. Reference frame was affixed to a spinous process, O-arm imaging ensued. These images were downloaded into the Stealth station. Using Stealth frameless stereotaxy, pedicle screw hole sites at L4 and L5 bilaterally were marked, drilled, and tapped. All screws were 6.5 mm in diameter, 45 mm length screws were used bilaterally at L4, and 40 mm length screws were used bilaterally at L5. Leksell rongeur was then utilized to remove spinous processes at L4 and the upper aspect of L5. A central trough was fashioned using drill and Kerrison punches, and this was widened similarly. Facet complexes were totally resected to allow for good decompression of the L4 and L5 nerve roots on each side. C-arm was brought into use and beginning on the right, the L4-L5 disc was incised and evacuated piecemeal with an array of rongeurs, curettes, and punches. Serial impactors were impacted into the disc space. Ultimately, a 9 x 24 mm Adaptix cage was impacted into the disc space using fluoroscopic guidance. This had been packed with a fusion substrate consisting of Abby and local autograft.Attention was turned to the contralateral side. After preparing the disc space, additional fusion substrate was placed in the midline anteriorly. The second 9 x 24 mm Adaptix cage was then impacted into place.   Solera 5.5 mm rods, 35 mm in length were then affixed to the screw heads on each side. Set screws were applied at L5. Using a mechanical reducer, the L4 screws were brought up to the tiffany. Using the \"diving board\" technique, the grade 1 listhesis at L4-L5 was completely reduced. This was done on each side. Some compression was performed across the disc space and then the set screws were tightened and broken off per routine. Nerve roots inspected and were found to be unencumbered on each side. The wound was washed out with a saline solution. " A Hemovac drain was brought in through a separate stab incision and left in the epidural space. Gelfoam was left in the gutter. Vancomycin powder was sprinkled in the depths and then more superficially as the wound was closed. The paraspinous muscle and fascia were reapproximated in an interrupted fashion with 0 Vicryl suture. 0.25% Marcaine was instilled into the paraspinous musculature and subcutaneous tissues. Subcutaneous tissues were closed in layers with 2-0, followed by 3-0 Vicryl sutures. The skin was closed in a running subcuticular fashion with 3-0 Vicryl sutures. Steri-Strips and a sterile dressing were applied. The patient was rolled onto her cart, extubated, and taken to the recovery room in satisfactory condition.                  Edgar Mcguire M.D.

## 2023-07-27 ENCOUNTER — TELEPHONE (OUTPATIENT)
Dept: NEUROSURGERY | Facility: CLINIC | Age: 68
End: 2023-07-27
Payer: COMMERCIAL

## 2023-07-27 DIAGNOSIS — Z98.1 S/P LUMBAR FUSION: Primary | ICD-10-CM

## 2023-07-27 LAB
HCT VFR BLD AUTO: 28.1 % (ref 34–46.6)
HGB BLD-MCNC: 9.3 G/DL (ref 12–15.9)

## 2023-07-27 PROCEDURE — 99024 POSTOP FOLLOW-UP VISIT: CPT | Performed by: NEUROLOGICAL SURGERY

## 2023-07-27 PROCEDURE — 97530 THERAPEUTIC ACTIVITIES: CPT

## 2023-07-27 PROCEDURE — 97535 SELF CARE MNGMENT TRAINING: CPT

## 2023-07-27 PROCEDURE — 85018 HEMOGLOBIN: CPT | Performed by: NEUROLOGICAL SURGERY

## 2023-07-27 PROCEDURE — 97165 OT EVAL LOW COMPLEX 30 MIN: CPT

## 2023-07-27 PROCEDURE — 25010000002 VANCOMYCIN 10 G RECONSTITUTED SOLUTION: Performed by: NEUROLOGICAL SURGERY

## 2023-07-27 PROCEDURE — 63710000001 ONDANSETRON PER 8 MG: Performed by: NEUROLOGICAL SURGERY

## 2023-07-27 PROCEDURE — 85014 HEMATOCRIT: CPT | Performed by: NEUROLOGICAL SURGERY

## 2023-07-27 PROCEDURE — 97161 PT EVAL LOW COMPLEX 20 MIN: CPT

## 2023-07-27 RX ORDER — OXYCODONE HYDROCHLORIDE AND ACETAMINOPHEN 5; 325 MG/1; MG/1
1 TABLET ORAL 3 TIMES DAILY PRN
Qty: 20 TABLET | Refills: 0 | Status: SHIPPED | OUTPATIENT
Start: 2023-07-27

## 2023-07-27 RX ADMIN — OXYCODONE HYDROCHLORIDE AND ACETAMINOPHEN 1 TABLET: 7.5; 325 TABLET ORAL at 04:28

## 2023-07-27 RX ADMIN — OXYCODONE HYDROCHLORIDE AND ACETAMINOPHEN 1 TABLET: 7.5; 325 TABLET ORAL at 13:18

## 2023-07-27 RX ADMIN — FAMOTIDINE 20 MG: 20 TABLET, FILM COATED ORAL at 20:46

## 2023-07-27 RX ADMIN — Medication 10 ML: at 08:53

## 2023-07-27 RX ADMIN — ACETAMINOPHEN 650 MG: 325 TABLET ORAL at 15:35

## 2023-07-27 RX ADMIN — IBUPROFEN 600 MG: 600 TABLET ORAL at 08:52

## 2023-07-27 RX ADMIN — ACETAMINOPHEN 650 MG: 325 TABLET ORAL at 08:52

## 2023-07-27 RX ADMIN — IBUPROFEN 600 MG: 600 TABLET ORAL at 20:47

## 2023-07-27 RX ADMIN — HYDROCHLOROTHIAZIDE 12.5 MG: 12.5 CAPSULE ORAL at 08:52

## 2023-07-27 RX ADMIN — FAMOTIDINE 20 MG: 20 TABLET, FILM COATED ORAL at 08:52

## 2023-07-27 RX ADMIN — ACETAMINOPHEN 650 MG: 325 TABLET ORAL at 20:46

## 2023-07-27 RX ADMIN — ROSUVASTATIN 10 MG: 10 TABLET, FILM COATED ORAL at 20:46

## 2023-07-27 RX ADMIN — ONDANSETRON HYDROCHLORIDE 4 MG: 4 TABLET, FILM COATED ORAL at 17:35

## 2023-07-27 RX ADMIN — IBUPROFEN 600 MG: 600 TABLET ORAL at 15:35

## 2023-07-27 RX ADMIN — LISINOPRIL 10 MG: 10 TABLET ORAL at 08:52

## 2023-07-27 RX ADMIN — VANCOMYCIN HYDROCHLORIDE 1250 MG: 10 INJECTION, POWDER, LYOPHILIZED, FOR SOLUTION INTRAVENOUS at 08:53

## 2023-07-27 RX ADMIN — LEVOTHYROXINE SODIUM 88 MCG: 0.09 TABLET ORAL at 04:28

## 2023-07-27 NOTE — THERAPY EVALUATION
Patient Name: Refugio Bañuelos  : 1955    MRN: [id number]                              Today's Date: 2023       Admit Date: 2023    Visit Dx:     ICD-10-CM ICD-9-CM   1. Lumbar stenosis with neurogenic claudication  M48.062 724.03     Patient Active Problem List   Diagnosis    Lumbar stenosis with neurogenic claudication     Past Medical History:   Diagnosis Date    Arthritis     Elevated cholesterol     Hypertension     Low back pain     PONV (postoperative nausea and vomiting)     Spinal headache     Thyroid disease      Past Surgical History:   Procedure Laterality Date    APPENDECTOMY  1982    CARPAL TUNNEL RELEASE Bilateral      SECTION      COLONOSCOPY  2023    22 polyps removed per pt    LAPAROSCOPIC TUBAL LIGATION      LUMBAR LAMINECTOMY DISCECTOMY DECOMPRESSION N/A 2023    Procedure: LUMBAR FUSION DECOMPRESSON WITH PEDICLE SCREWS L4-5;  Surgeon: Edgar Mcguire MD;  Location: [facility];  Service: Neurosurgery;  Laterality: N/A;    LUMBAR SPINE SURGERY      L5/S1 decompression, Dr. Clarke [facility]      General Information       Row Name 23 1122          Physical Therapy Time and Intention    Document Type evaluation  -     Mode of Treatment physical therapy  -       Row Name 23 1122          General Information    Patient Profile Reviewed yes  -HP     Existing Precautions/Restrictions fall;spinal  -HP     Barriers to Rehab none identified  -HP       Row Name 23 1122          Living Environment    People in Home spouse  -HP       Row Name 23 1122          Home Main Entrance    Number of Stairs, Main Entrance two  -HP     Stair Railings, Main Entrance none  -HP       Row Name 23 1122          Stairs Within Home, Primary    Number of Stairs, Within Home, Primary none  -HP       Row Name 23 1122          Cognition    Orientation Status (Cognition) oriented x 4  -HP       Row Name 23 1122          Safety Issues,  Functional Mobility    Safety Issues Affecting Function (Mobility) insight into deficits/self-awareness;awareness of need for assistance;safety precaution awareness  -HP     Impairments Affecting Function (Mobility) pain;strength;balance  -HP               User Key  (r) = Recorded By, (t) = Taken By, (c) = Cosigned By      Initials Name Provider Type    Evelyne Mccracken PT Physical Therapist                   Mobility       Row Name 07/27/23 1123          Bed Mobility    Bed Mobility supine-sit;sit-supine  -HP     Supine-Sit [place] (Bed Mobility) standby assist  -HP     Sit-Supine [place] (Bed Mobility) standby assist  -HP     Comment, (Bed Mobility) VC for spinal precautions with good recall for sequencing  -HP       Row Name 07/27/23 1123          Sit-Stand Transfer    Sit-Stand [place] (Transfers) contact guard  -HP       Row Name 07/27/23 1123          Gait/Stairs (Locomotion)    [place] Level (Gait) contact guard  -HP     Distance in Feet (Gait) 200  -HP     Deviations/Abnormal Patterns (Gait) bilateral deviations;base of support, wide;weight shifting decreased;stride length decreased;gait speed decreased  -HP     Bilateral Gait Deviations forward flexed posture;heel strike decreased  -HP     [place] Level (Stairs) contact guard  -HP     Assistive Device (Stairs) cane, straight  -HP     Number of Steps (Stairs) 5  -HP     Ascending Technique (Stairs) step-to-step  -HP     Descending Technique (Stairs) step-to-step  -HP     Comment, (Gait/Stairs) Pt amb in [place] with step-through gait pattern and wide LEILANI. Mild unsteadiness noted. No knee buckling or LOB noted. Pt navigated steps with SPC and step-to pattern. VC for sequencing with good recall. SPC issued for safety. Activity limited by fatigue.  -HP               User Key  (r) = Recorded By, (t) = Taken By, (c) = Cosigned By      Initials Name Provider Type    Evelyne Mccracken PT Physical Therapist                    Obj/Interventions       Row Name 07/27/23 1125          Motor Skills    Therapeutic Exercise other (see comments)  HEP handout given and reviewed  -       Row Name 07/27/23 1125          Balance    Balance Assessment sitting static balance;sitting dynamic balance;sit to stand dynamic balance;standing static balance;standing dynamic balance  -     Static Sitting Balance standby assist  -     Dynamic Sitting Balance standby assist  -     Position, Sitting Balance sitting edge of bed  -     Static Standing Balance contact guard  -     Dynamic Standing Balance contact guard  -     Position/Device Used, Standing Balance unsupported  -     Balance Interventions sitting;standing;sit to stand;occupation based/functional task  -       Row Name 07/27/23 1125          Sensory Assessment (Somatosensory)    Sensory Assessment (Somatosensory) LE sensation intact  -               User Key  (r) = Recorded By, (t) = Taken By, (c) = Cosigned By      Initials Name Provider Type     Evelyne Draper, PT Physical Therapist                   Goals/Plan       Row Name 07/27/23 1128          Bed Mobility Goal 1 (PT)    Activity/Assistive Device (Bed Mobility Goal 1, PT) sit to supine/supine to sit  -     [place] Level/Cues Needed (Bed Mobility Goal 1, PT) modified independence  -HP     Time Frame (Bed Mobility Goal 1, PT) long term goal (LTG);5 days  -HP     Progress/Outcomes (Bed Mobility Goal 1, PT) goal ongoing  -       Row Name 07/27/23 1128          Transfer Goal 1 (PT)    Activity/Assistive Device (Transfer Goal 1, PT) sit-to-stand/stand-to-sit  -     [place] Level/Cues Needed (Transfer Goal 1, PT) modified independence  -HP     Time Frame (Transfer Goal 1, PT) long term goal (LTG);5 days  -HP     Progress/Outcome (Transfer Goal 1, PT) goal ongoing  -       Row Name 07/27/23 1128          Gait Training Goal 1 (PT)    Activity/Assistive Device (Gait Training Goal 1, PT) gait (walking locomotion)   -HP     [place] Level (Gait Training Goal 1, PT) modified independence  -HP     Distance (Gait Training Goal 1, PT) 500  -HP     Time Frame (Gait Training Goal 1, PT) long term goal (LTG);5 days  -HP     Progress/Outcome (Gait Training Goal 1, PT) goal ongoing  -HP       Row Name 07/27/23 1128          Stairs Goal 1 (PT)    Activity/Assistive Device (Stairs Goal 1, PT) ascending stairs;descending stairs  -HP     [place] Level/Cues Needed (Stairs Goal 1, PT) modified independence  -HP     Number of Stairs (Stairs Goal 1, PT) 2  -HP     Time Frame (Stairs Goal 1, PT) long term goal (LTG);5 days  -HP     Progress/Outcome (Stairs Goal 1, PT) goal ongoing  -HP       Row Name 07/27/23 1128          Therapy Assessment/Plan (PT)    Planned Therapy Interventions (PT) balance training;bed mobility training;gait training;home exercise program;patient/family education;transfer training;stretching;strengthening;stair training  -               User Key  (r) = Recorded By, (t) = Taken By, (c) = Cosigned By      Initials Name Provider Type    HP Evelyne Draper, PT Physical Therapist                   Clinical Impression       Row Name 07/27/23 1126          Pain    Pretreatment Pain Rating 4/10  -HP     Posttreatment Pain Rating 4/10  -HP     Pain Location incisional  -HP     Pain Location - back  -HP     Pre/Posttreatment Pain Comment Pt denied pain into BLE  -HP     Pain Intervention(s) Repositioned;Ambulation/increased activity  -       Row Name 07/27/23 1126          Plan of Care Review    Plan of Care Reviewed With patient;spouse  -HP     Progress no change  -HP     Outcome Evaluation Pt amb in halls without AD and CGA. Pt navigated steps with SPC and CGA. VC for sequencing. Frequent ambulation encouraged. HEP handout given. Recommend d/c home with assist when medically appropriate.  -       Row Name 07/27/23 1126          Therapy Assessment/Plan (PT)    Rehab Potential (PT) good, to achieve stated therapy  goals  -HP     Criteria for Skilled Interventions Met (PT) yes;meets criteria;skilled treatment is necessary  -HP     Therapy Frequency (PT) daily  -HP       Row Name 07/27/23 1126          Vital Signs    Pre Systolic BP Rehab --  VSS  -HP     Pre Patient Position Supine  -HP     Intra Patient Position Standing  -HP     Post Patient Position Supine  -HP       Row Name 07/27/23 1126          Positioning and Restraints    Pre-Treatment Position in bed  -HP     Post Treatment Position bed  -HP     In Bed notified nsg;supine;fowlers;exit alarm on;encouraged to call for assist;call light within reach;with family/caregiver;side rails up x2  -HP               User Key  (r) = Recorded By, (t) = Taken By, (c) = Cosigned By      Initials Name Provider Type    Evelyne Mccracken PT Physical Therapist                   Outcome Measures       Row Name 07/27/23 1128          How much help from another person do you currently need...    Turning from your back to your side while in flat bed without using bedrails? 4  -HP     Moving from lying on back to sitting on the side of a flat bed without bedrails? 4  -HP     Moving to and from a bed to a chair (including a wheelchair)? 4  -HP     Standing up from a chair using your arms (e.g., wheelchair, bedside chair)? 4  -HP     Climbing 3-5 steps with a railing? 3  -HP     To walk in hospital room? 3  -HP     AM-PAC 6 Clicks Score (PT) 22  -HP     Highest level of mobility 7 --> Walked 25 feet or more  -       Row Name 07/27/23 1128 07/27/23 0937       Functional Assessment    Outcome Measure Options AM-PAC 6 Clicks Basic Mobility (PT)  -HP AM-PAC 6 Clicks Daily Activity (OT)  -AN              User Key  (r) = Recorded By, (t) = Taken By, (c) = Cosigned By      Initials Name Provider Type    Evelyne Mccracken PT Physical Therapist    Sheryl Kelley OT Occupational Therapist                                 Physical Therapy Education       Title: PT OT SLP Therapies (In Progress)        Topic: Physical Therapy (In Progress)       Point: Mobility training (Done)       Learning Progress Summary             Patient Acceptance, E,D, VU,DU by  at 7/27/2023 1129                         Point: Home exercise program (Not Started)       Learner Progress:  Not documented in this visit.              Point: Body mechanics (Done)       Learning Progress Summary             Patient Acceptance, E,D, VU,DU by  at 7/27/2023 1129                         Point: Precautions (Done)       Learning Progress Summary             Patient Acceptance, E,D, VU,DU by  at 7/27/2023 1129                                         User Key       Initials Effective Dates Name Provider Type Discipline     06/01/21 -  Evelyne Draper, PT Physical Therapist PT                  PT Recommendation and Plan  Planned Therapy Interventions (PT): balance training, bed mobility training, gait training, home exercise program, patient/family education, transfer training, stretching, strengthening, stair training  Plan of Care Reviewed With: patient, spouse  Progress: no change  Outcome Evaluation: Pt amb in halls without AD and CGA. Pt navigated steps with SPC and CGA. VC for sequencing. Frequent ambulation encouraged. HEP handout given. Recommend d/c home with assist when medically appropriate.     Time Calculation:   PT Evaluation Complexity  History, PT Evaluation Complexity: 1-2 personal factors and/or comorbidities  Examination of Body Systems (PT Eval Complexity): total of 3 or more elements  Clinical Presentation (PT Evaluation Complexity): stable  Clinical Decision Making (PT Evaluation Complexity): low complexity  Overall Complexity (PT Evaluation Complexity): low complexity     PT Charges       Row Name 07/27/23 1050             Time Calculation    Start Time 1050  -HP      PT Received On 07/27/23  -      PT Goal Re-Cert Due Date 08/06/23  -         Timed Charges    90291 - PT Therapeutic Activity Minutes 10  -HP          Untimed Charges    PT Eval/Re-eval Minutes 34  -HP         Total Minutes    Timed Charges Total Minutes 10  -HP      Untimed Charges Total Minutes 34  -HP       Total Minutes 44  -HP                User Key  (r) = Recorded By, (t) = Taken By, (c) = Cosigned By      Initials Name Provider Type    HP Evelyne Draper, PT Physical Therapist                  Therapy Charges for Today       Code Description Service Date Service Provider Modifiers Qty    48139922210 HC PT THERAPEUTIC ACT EA 15 MIN 7/27/2023 Evelyne Draper, PT GP 1    89310738892 HC PT EVAL LOW COMPLEXITY 3 7/27/2023 Evelyne Draper, PT GP 1            PT G-Codes  Outcome Measure Options: AM-PAC 6 Clicks Basic Mobility (PT)  AM-PAC 6 Clicks Score (PT): 22  AM-PAC 6 Clicks Score (OT): 21  PT Discharge Summary  Anticipated Discharge Disposition (PT): home with assist    Evelyne Draper, VAMSI  7/27/2023

## 2023-07-27 NOTE — PLAN OF CARE
Goal Outcome Evaluation:  Plan of Care Reviewed With: patient        Progress: no change  Outcome Evaluation: Pt presents near her functional baseline with all ADLs and mobility. Pt issued and provided education on AE for LB ADLs, demonstrating understanding. Pt ambulated with stand by assist and no LOB. Rec home at [place].      Anticipated Discharge Disposition (OT): home with assist

## 2023-07-27 NOTE — PLAN OF CARE
Goal Outcome Evaluation:  Plan of Care Reviewed With: patient, spouse        Progress: no change  Outcome Evaluation: Pt amb in halls without AD and CGA. Pt navigated steps with SPC and CGA. VC for sequencing. Frequent ambulation encouraged. HEP handout given. Recommend d/c home with assist when medically appropriate.      Anticipated Discharge Disposition (PT): home with assist

## 2023-07-27 NOTE — TELEPHONE ENCOUNTER
Post Op appt has been sched for 8/18/23 @1:30 w/ Hemanth Chandra PA-C. Pt is advised to get Xrays prior to appt. Mailed appt reminder.

## 2023-07-27 NOTE — THERAPY DISCHARGE NOTE
Acute Care - Occupational Therapy Discharge  [facility]    Patient Name: Refugio Bañuelos  : 1955    MRN: [id number]                              Today's Date: 2023       Admit Date: 2023    Visit Dx:     ICD-10-CM ICD-9-CM   1. Lumbar stenosis with neurogenic claudication  M48.062 724.03     Patient Active Problem List   Diagnosis    Lumbar stenosis with neurogenic claudication     Past Medical History:   Diagnosis Date    Arthritis     Elevated cholesterol     Hypertension     Low back pain     PONV (postoperative nausea and vomiting)     Spinal headache     Thyroid disease      Past Surgical History:   Procedure Laterality Date    APPENDECTOMY      CARPAL TUNNEL RELEASE Bilateral      SECTION      COLONOSCOPY  2023    22 polyps removed per pt    LAPAROSCOPIC TUBAL LIGATION      LUMBAR SPINE SURGERY      L5/S1 decompression, [facility]      General Information       Row Name 23 0929          OT Time and Intention    Document Type discharge evaluation/summary  -AN     Mode of Treatment occupational therapy  -AN       Row Name 2329          General Information    Patient Profile Reviewed yes  -AN     Prior Level of Function independent:;all household mobility;community mobility;gait;ADL's  -AN     Existing Precautions/Restrictions spinal;other (see comments)  HV  -AN     Barriers to Rehab none identified  -AN       Row Name 2329          Living Environment    People in Home spouse  -AN       Row Name 2329          Home Main Entrance    Number of Stairs, Main Entrance two  -AN     Stair Railings, Main Entrance none  -AN       Row Name 2329          Stairs Within Home, Primary    Number of Stairs, Within Home, Primary none  -AN     Stairs Comment, Within Home, Primary walk in shower  -AN       Row Name 2329          Cognition    Orientation Status (Cognition) oriented x 4  -AN       Row Name 23           Safety Issues, Functional Mobility    Safety Issues Affecting Function (Mobility) safety precaution awareness;safety precautions follow-through/compliance  -AN     Impairments Affecting Function (Mobility) pain  -AN               User Key  (r) = Recorded By, (t) = Taken By, (c) = Cosigned By      Initials Name Provider Type    AN Sheryl Lopez OT Occupational Therapist                   Mobility/ADL's       Row Name 07/27/23 0930          Bed Mobility    Bed Mobility supine-sit  -AN     Supine-Sit Luce (Bed Mobility) supervision  -AN     Bed Mobility, Safety Issues impaired trunk control for bed mobility  -AN     Comment, (Bed Mobility) pt educated on spinal precautions and how to maintain with bed mobility; pt demo'd understanding  -AN       Row Name 07/27/23 0930          Transfers    Transfers sit-stand transfer;stand-sit transfer  -AN       Row Name 07/27/23 0930          Sit-Stand Transfer    Sit-Stand Luce (Transfers) standby assist  -AN       Row Name 07/27/23 0930          Stand-Sit Transfer    Stand-Sit Luce (Transfers) standby assist  -AN       Row Name 07/27/23 0930          Functional Mobility    Functional Mobility- Ind. Level standby assist  -AN     Functional Mobility-Distance (Feet) --  household distance  -AN     Functional Mobility- Comment pt ambulated household distance with stand by assist and no LOB  -AN       Row Name 07/27/23 0930          Activities of Daily Living    BADL Assessment/Intervention bathing;lower body dressing;toileting  -AN       Row Name 07/27/23 0930          Bathing Assessment/Intervention    Assistive Devices (Bathing) long-handled sponge  -AN     Comment, (Bathing) pt issued and educated on LE bathing using long handled sponge in order to maintain spinal precautions  -AN       Row Name 07/27/23 0930          Lower Body Dressing Assessment/Training    Luce Level (Lower Body Dressing) doff;don;supervision;verbal cues  -AN      Assistive Devices (Lower Body Dressing) sock-aid;reacher;long-handled shoe horn  -AN     Position (Lower Body Dressing) unsupported sitting  -AN     Comment, (Lower Body Dressing) pt issued and provided education on adaptive equipment for LB ADLs in order to maintain spinal precautions  -AN       Row Name 07/27/23 0930          Toileting Assessment/Training    Comment, (Toileting) pt educated on technique for joby care and how to maintain spinal precautions  -AN               User Key  (r) = Recorded By, (t) = Taken By, (c) = Cosigned By      Initials Name Provider Type    Sheryl Kelley OT Occupational Therapist                   Obj/Interventions       [facility] Name 07/27/23 0933          Sensory Assessment (Somatosensory)    Sensory Assessment (Somatosensory) sensation intact  -AN       Row Name 07/27/23 0933          Vision Assessment/Intervention    Visual Impairment/Limitations WFL  -AN       Row Name 07/27/23 0933          Range of Motion Comprehensive    General Range of Motion no range of motion deficits identified  -AN       Row Name 07/27/23 0933          Strength Comprehensive (MMT)    General Manual Muscle Testing (MMT) Assessment no strength deficits identified  -AN       Row Name 07/27/23 0933          Balance    Balance Assessment sitting static balance;sitting dynamic balance;sit to stand dynamic balance;standing static balance;standing dynamic balance  -AN     Static Sitting Balance independent  -AN     Dynamic Sitting Balance independent  -AN     Position, Sitting Balance unsupported;sitting edge of bed;sitting in chair  -AN     Sit to Stand Dynamic Balance verbal cues;standby assist  -AN     Static Standing Balance standby assist  -AN     Dynamic Standing Balance standby assist  -AN     Position/Device Used, Standing Balance unsupported  -AN     Comment, Balance no LOB throughout  -AN               User Key  (r) = Recorded By, (t) = Taken By, (c) = Cosigned By      Initials Name Provider Type     Sheryl Kelley, PHILLIP Occupational Therapist                   Goals/Plan    No documentation.                  Clinical Impression       Row Name 07/27/23 0933          Pain Assessment    Pretreatment Pain Rating 5/10  -AN     Posttreatment Pain Rating 5/10  -AN     Pain Location incisional  -AN     Pain Location - back  -AN     Pre/Posttreatment Pain Comment tolerated  -AN     Pain Intervention(s) Ambulation/increased activity;Repositioned  -AN       Row Name 07/27/23 0933          Plan of Care Review    Plan of Care Reviewed With patient  -AN     Progress no change  -AN     Outcome Evaluation Pt presents near her functional baseline with all ADLs and mobility. Pt issued and provided education on AE for LB ADLs, demonstrating understanding. Pt ambulated with stand by assist and no LOB. Rec home at PA.  -AN       Row Name 07/27/23 0933          Therapy Assessment/Plan (OT)    Patient/Family Therapy Goal Statement (OT) --  -AN     Rehab Potential (OT) good, to achieve stated therapy goals  -AN     Criteria for Skilled Therapeutic Interventions Met (OT) --  -AN     Therapy Frequency (OT) evaluation only  -AN       [facility] Name 07/27/23 0933          Therapy Plan Review/Discharge Plan (OT)    Anticipated Discharge Disposition (OT) home with assist  -AN       Row Name 07/27/23 0933          Vital Signs    Pre Systolic BP Rehab --  VSS  -AN     O2 Delivery Pre Treatment room air  -AN     O2 Delivery Intra Treatment room air  -AN     O2 Delivery Post Treatment room air  -AN     Pre Patient Position Supine  -AN     Intra Patient Position Standing  -AN     Post Patient Position Sitting  -AN       [facility] Name 07/27/23 0933          Positioning and Restraints    Pre-Treatment Position in bed  -AN     Post Treatment Position chair  -AN     In Chair notified nsg;reclined;call light within reach;encouraged to call for assist;exit alarm on;waffle cushion;legs elevated  -AN               User Key  (r) = Recorded By, (t) = Taken  By, (c) = Cosigned By      Initials Name Provider Type    Sheryl Kelley OT Occupational Therapist                   Outcome Measures       Row Name 07/27/23 0937          How much help from another is currently needed...    Putting on and taking off regular lower body clothing? 3  -AN     Bathing (including washing, rinsing, and drying) 3  -AN     Toileting (which includes using toilet bed pan or urinal) 3  -AN     Putting on and taking off regular upper body clothing 4  -AN     Taking care of personal grooming (such as brushing teeth) 4  -AN     Eating meals 4  -AN     AM-PAC 6 Clicks Score (OT) 21  -AN       Row Name 07/27/23 0937          Functional Assessment    Outcome Measure Options AM-PAC 6 Clicks Daily Activity (OT)  -AN               User Key  (r) = Recorded By, (t) = Taken By, (c) = Cosigned By      Initials Name Provider Type    Sheryl Kelley OT Occupational Therapist                  Occupational Therapy Education       Title: PT OT SLP Therapies (In Progress)       Topic: Occupational Therapy (In Progress)       Point: ADL training (Done)       Description:   Instruct learner(s) on proper safety adaptation and remediation techniques during self care or transfers.   Instruct in proper use of assistive devices.                  Learning Progress Summary             Patient Acceptance, E, VU by AN at 7/27/2023 0937                         Point: Home exercise program (Not Started)       Description:   Instruct learner(s) on appropriate technique for monitoring, assisting and/or progressing therapeutic exercises/activities.                  Learner Progress:  Not documented in this visit.              Point: Precautions (Done)       Description:   Instruct learner(s) on prescribed precautions during self-care and functional transfers.                  Learning Progress Summary             Patient Acceptance, E, VU by AN at 7/27/2023 0937                         Point: Body mechanics (Done)        Description:   Instruct learner(s) on proper positioning and spine alignment during self-care, functional mobility activities and/or exercises.                  Learning Progress Summary             Patient Acceptance, E, VU by AN at 7/27/2023 0937                                         User Key       Initials Effective Dates Name Provider Type Discipline    AN 09/21/21 -  Sheryl Lopez OT Occupational Therapist OT                  OT Recommendation and Plan  Therapy Frequency (OT): evaluation only  Plan of Care Review  Plan of Care Reviewed With: patient  Progress: no change  Outcome Evaluation: Pt presents near her functional baseline with all ADLs and mobility. Pt issued and provided education on AE for LB ADLs, demonstrating understanding. Pt ambulated with stand by assist and no LOB. Rec home at dc.  Plan of Care Reviewed With: patient  Outcome Evaluation: Pt presents near her functional baseline with all ADLs and mobility. Pt issued and provided education on AE for LB ADLs, demonstrating understanding. Pt ambulated with stand by assist and no LOB. Rec home at dc.     Time Calculation:   Evaluation Complexity (OT)  Review Occupational Profile/Medical/Therapy History Complexity: brief/low complexity  Assessment, Occupational Performance/Identification of Deficit Complexity: 1-3 performance deficits  Clinical Decision Making Complexity (OT): problem focused assessment/low complexity  Overall Complexity of Evaluation (OT): low complexity     Time Calculation- OT       Row Name 07/27/23 0938             Time Calculation- OT    OT Start Time 0815  -AN      OT Received On 07/27/23  -AN         Timed Charges    12726 - OT Self Care/Mgmt Minutes 8  -AN         Untimed Charges    OT Eval/Re-eval Minutes 46  -AN         Total Minutes    Timed Charges Total Minutes 8  -AN      Untimed Charges Total Minutes 46  -AN       Total Minutes 54  -AN                User Key  (r) = Recorded By, (t) = Taken By, (c) =  Cosigned By      Initials Name Provider Type    Sheryl Kelley OT Occupational Therapist                  Therapy Charges for Today       Code Description Service Date Service Provider Modifiers Qty    37694981049 HC OT SELF CARE/MGMT/TRAIN EA 15 MIN 7/27/2023 Sheryl Lopez OT GO 1    [id number]  OT EVAL LOW COMPLEXITY 4 7/27/2023 Sheryl Lopez OT GO 1               OT Discharge Summary  Anticipated Discharge Disposition (OT): home with assist  Reason for Discharge: At baseline function  Discharge Destination: Home with assist    Sheryl Lopez OT  7/27/2023

## 2023-07-27 NOTE — CASE MANAGEMENT/SOCIAL WORK
Continued Stay Note  [facility]     Patient Name: Refugio Bañuelos  MRN: [id number]  Today's Date: 7/27/2023    Admit Date: 7/26/2023    Plan: Home   Discharge Plan       Row Name 07/27/23 1045       Plan    Plan Home    Plan Comments chart reviewed. I met with Ms Bañuelos and spouse at bedside to discuss d/c plan. Her plan is to return home.  will be available to assist as needed. She was independent with all ADLs prior to admission, she uses no assistive equipment. OT eval noted. Await PT eval. No d/c needs identified at this time, please advise if any arise    Final Discharge Disposition Code 01 - home or self-care                   Discharge Codes    No documentation.                 Expected Discharge Date and Time       Expected Discharge Date Expected Discharge Time    Jul 28, 2023               Sonja C Kellerman, RN

## 2023-07-27 NOTE — TELEPHONE ENCOUNTER
Can someone please put in an order for AP and lateral lumbar spine x-rays needed for 3 wks F/U Post Lumbar Fusion. Thank you.

## 2023-07-27 NOTE — PLAN OF CARE
Goal Outcome Evaluation:                        Problem: Adult Inpatient Plan of Care  Goal: Absence of Hospital-Acquired Illness or Injury  Intervention: Identify and Manage Fall Risk  Recent Flowsheet Documentation  Taken 7/27/2023 0200 by Hermes Angel RN  Safety Promotion/Fall Prevention:   activity supervised   safety round/check completed   toileting scheduled  Taken 7/27/2023 0000 by Hermes Angel RN  Safety Promotion/Fall Prevention:   activity supervised   safety round/check completed   toileting scheduled  Taken 7/26/2023 2200 by Hermes Angel RN  Safety Promotion/Fall Prevention:   activity supervised   safety round/check completed   toileting scheduled  Taken 7/26/2023 2000 by Hermes Angel RN  Safety Promotion/Fall Prevention:   activity supervised   safety round/check completed   toileting scheduled  Intervention: Prevent Skin Injury  Recent Flowsheet Documentation  Taken 7/27/2023 0200 by Hermes Angel RN  Body Position: supine  Taken 7/27/2023 0000 by Hermes Angel RN  Body Position:   left   side-lying  Taken 7/26/2023 2200 by Hermes Angel RN  Body Position: supine  Taken 7/26/2023 2000 by Hermes Angel RN  Body Position: supine  Intervention: Prevent and Manage VTE (Venous Thromboembolism) Risk  Recent Flowsheet Documentation  Taken 7/27/2023 0200 by Hermes Angel RN  VTE Prevention/Management:   bilateral   sequential compression devices on  Taken 7/27/2023 0000 by Hermes Angel RN  VTE Prevention/Management:   bilateral   sequential compression devices on  Taken 7/26/2023 2200 by Hermes Angel RN  VTE Prevention/Management:   bilateral   sequential compression devices on  Taken 7/26/2023 2000 by Hermes Angel RN  VTE Prevention/Management:   bilateral   sequential compression devices on  Intervention: Prevent Infection  Recent Flowsheet Documentation  Taken 7/27/2023 0200 by Hermes Angel RN  Infection Prevention: environmental surveillance performed  Taken  7/27/2023 0000 by Hermes Angel RN  Infection Prevention: environmental surveillance performed  Taken 7/26/2023 2200 by Hermes Angel RN  Infection Prevention: environmental surveillance performed  Taken 7/26/2023 2000 by Hermes Angel RN  Infection Prevention: environmental surveillance performed  Goal: Optimal Comfort and Wellbeing  Intervention: Monitor Pain and Promote Comfort  Recent Flowsheet Documentation  Taken 7/27/2023 0200 by Hermes Angel RN  Pain Management Interventions: quiet environment facilitated  Taken 7/27/2023 0000 by Hermes Angel RN  Pain Management Interventions: quiet environment facilitated  Taken 7/26/2023 2200 by Hermes Angel RN  Pain Management Interventions: quiet environment facilitated  Intervention: Provide Person-Centered Care  Recent Flowsheet Documentation  Taken 7/27/2023 0200 by Hermes Angel RN  Trust Relationship/Rapport: care explained  Taken 7/27/2023 0000 by Hermes Angel RN  Trust Relationship/Rapport: care explained  Taken 7/26/2023 2200 by Hermes Angel RN  Trust Relationship/Rapport: care explained  Taken 7/26/2023 2000 by Hermes Angel RN  Trust Relationship/Rapport: care explained     VSS, voids well, rested throughout the night, pain managed with PRN medications, will continue to monitor for changes.

## 2023-07-27 NOTE — PROGRESS NOTES
"NEUROSURGERY PROGRESS NOTE     LOS: 1 day   Patient Care Team:  Jelani Dahl MD as PCP - General (Physician Assistant)  Edgar Mcguire MD as Consulting Physician (Neurosurgery)    Chief Complaint: Low back and left leg pain.    POD#: 1 Day Post-Op  Procedures:  L4-5 PLIF.    Interval History:   Patient Complaints: Postop nausea but improved.  Patient Denies: Lower extremity weakness or numbness.  Her left leg pain is absent.    Vital Signs: Blood pressure 106/54, pulse 74, temperature 97.4 °F (36.3 °C), temperature source Oral, resp. rate 14, height 160 cm (63\"), weight 83.5 kg (184 lb), SpO2 94 %.  Intake/Output:   Intake/Output Summary (Last 24 hours) at 7/27/2023 0702  Last data filed at 7/27/2023 0300  Gross per 24 hour   Intake 1400 ml   Output 780 ml   Net 620 ml     Drain output: 150/80 mL.    Physical Exam:  The patient is alert and in good spirits.  Motor function is intact.  Dry dressing is in place on her incision.     Data Review: H&H pending.    Assessment/Plan:  1.  L4-5 spondylolisthesis, stenosis, instability status post decompression with fusion and stabilization.  2.  History of hypertension.  3.  Disposition: Mobilize patient.  Drain out tomorrow morning.  Home tomorrow if doing well.  Follow-up with PA-KATELYNN in the office in approximately 3 weeks.  Instructions given to patient.  Discharge Rx and orders placed.      Edgar Mcguire MD  07/27/23  07:02 EDT    "

## 2023-07-28 VITALS
OXYGEN SATURATION: 94 % | BODY MASS INDEX: 32.6 KG/M2 | HEIGHT: 63 IN | DIASTOLIC BLOOD PRESSURE: 43 MMHG | HEART RATE: 71 BPM | RESPIRATION RATE: 16 BRPM | WEIGHT: 184 LBS | SYSTOLIC BLOOD PRESSURE: 106 MMHG | TEMPERATURE: 98.4 F

## 2023-07-28 PROBLEM — M48.062 LUMBAR STENOSIS WITH NEUROGENIC CLAUDICATION: Status: RESOLVED | Noted: 2023-06-21 | Resolved: 2023-07-28

## 2023-07-28 PROCEDURE — 63710000001 ONDANSETRON PER 8 MG: Performed by: NEUROLOGICAL SURGERY

## 2023-07-28 PROCEDURE — 97116 GAIT TRAINING THERAPY: CPT

## 2023-07-28 RX ADMIN — HYDROCHLOROTHIAZIDE 12.5 MG: 12.5 CAPSULE ORAL at 08:48

## 2023-07-28 RX ADMIN — ONDANSETRON HYDROCHLORIDE 4 MG: 4 TABLET, FILM COATED ORAL at 11:52

## 2023-07-28 RX ADMIN — LISINOPRIL 10 MG: 10 TABLET ORAL at 08:48

## 2023-07-28 RX ADMIN — ACETAMINOPHEN 650 MG: 325 TABLET ORAL at 08:48

## 2023-07-28 RX ADMIN — FAMOTIDINE 20 MG: 20 TABLET, FILM COATED ORAL at 08:48

## 2023-07-28 RX ADMIN — OXYCODONE HYDROCHLORIDE AND ACETAMINOPHEN 1 TABLET: 7.5; 325 TABLET ORAL at 11:52

## 2023-07-28 RX ADMIN — IBUPROFEN 600 MG: 600 TABLET ORAL at 08:48

## 2023-07-28 RX ADMIN — LEVOTHYROXINE SODIUM 88 MCG: 0.09 TABLET ORAL at 06:08

## 2023-07-28 RX ADMIN — Medication 10 ML: at 08:49

## 2023-07-28 NOTE — DISCHARGE SUMMARY
[facility]    Date of Admission: 7/26/2023  Date of Discharge:  7/28/2023    Discharge Diagnosis:   Lumbar decompression with fusion, L4-5    Procedures Performed  Procedure(s):  LUMBAR FUSION DECOMPRESSON WITH PEDICLE SCREWS L4-5       Presenting Problem/History of Present Illness  Lumbar stenosis with neurogenic claudication [M48.062]     Hospital Course  Patient is a 68 y.o. female presented with symptoms of neurogenic claudication secondary to spinal stenosis.  The patient was admitted to [facility] on 7/26/2023 and underwent an uncomplicated lumbar fusion at L4-5.  She participated with PT and OT after surgery and progressed her activities.  Prior to discharge on 7/28/2023 she was ambulating without difficulties, her pain was controlled with oral medications and she was taking p.o. without any nausea or vomiting.  Her wound remained dry and intact and Aquacel was placed at the time of discharge on 7/28/2023.    Condition on Discharge:  satisfactory    Discharge Disposition  Home or Self Care    Discharge Medications     Discharge Medications        New Medications        Instructions Start Date   oxyCODONE-acetaminophen 5-325 MG per tablet  Commonly known as: PERCOCET   1 tablet, Oral, 3 Times Daily PRN             Continue These Medications        Instructions Start Date   acetaminophen 650 MG 8 hr tablet  Commonly known as: TYLENOL   650 mg, Oral, Every 8 Hours PRN      levothyroxine 88 MCG tablet  Commonly known as: SYNTHROID, LEVOTHROID   1 tablet, Oral, Daily      lisinopril-hydrochlorothiazide 20-12.5 MG per tablet  Commonly known as: PRINZIDE,ZESTORETIC   1 tablet, Oral, Every 12 Hours Scheduled      multivitamin with minerals tablet tablet   1 tablet, Oral, Daily      naproxen sodium 220 MG tablet  Commonly known as: ALEVE   220 mg, Oral, 2 Times Daily PRN      rosuvastatin 10 MG tablet  Commonly known as: CRESTOR   10 mg, Oral, Nightly       Vitamin D3 1.25 MG (65769 UT) capsule   1 capsule, Oral, Weekly               Follow-up Appointments  Future Appointments   Date Time Provider Department Center   8/18/2023  1:30 PM Hemanth Chandra PA-C [facility]     Additional Instructions for the Follow-ups that You Need to Schedule       Discharge Follow-up with Specified Provider: Maynor; 3 Weeks   As directed      To: Maynor   Follow Up: 3 Weeks   Follow Up Details: Follow-up with physician's assistant in 3 weeks with AP and lateral lumbar spine x-rays                Referring Provider  Edgar Mcguire MD    PCP   Jelani Dahl MD Debbie A Croucher, PA-C  07/28/23  10:42 EDT

## 2023-07-28 NOTE — PLAN OF CARE
Goal Outcome Evaluation:  Plan of Care Reviewed With: patient        Progress: improving  Outcome Evaluation: Patient demonstrated safe mobility on castro. She is going home with her spouse.      Anticipated Discharge Disposition (PT): home with assist

## 2023-07-28 NOTE — THERAPY DISCHARGE NOTE
Patient Name: Refugio Bañuelos  : 1955    MRN: [id number]                              Today's Date: 2023       Admit Date: 2023    Visit Dx:     ICD-10-CM ICD-9-CM   1. Lumbar stenosis with neurogenic claudication  M48.062 724.03     Patient Active Problem List   Diagnosis   (none) - all problems resolved or deleted     Past Medical History:   Diagnosis Date    Arthritis     Elevated cholesterol     Hypertension     Low back pain     PONV (postoperative nausea and vomiting)     Spinal headache     Thyroid disease      Past Surgical History:   Procedure Laterality Date    APPENDECTOMY      CARPAL TUNNEL RELEASE Bilateral      SECTION      COLONOSCOPY  2023    22 polyps removed per pt    LAPAROSCOPIC TUBAL LIGATION      LUMBAR LAMINECTOMY DISCECTOMY DECOMPRESSION N/A 2023    Procedure: LUMBAR FUSION DECOMPRESSON WITH PEDICLE SCREWS L4-5;  Surgeon: Edgar Mcguire MD;  Location: [facility];  Service: Neurosurgery;  Laterality: N/A;    LUMBAR SPINE SURGERY      L5/S1 decompression, Dr. Clarke [facility]      General Information       Row Name 23 1118          Physical Therapy Time and Intention    Document Type discharge treatment  -SC     Mode of Treatment physical therapy  -SC       Row Name 23 1118          General Information    Patient Profile Reviewed yes  -SC     Existing Precautions/Restrictions fall;spinal  -SC       Row Name 23 1118          Cognition    Orientation Status (Cognition) oriented x 4  -SC       Row Name 23 1118          Safety Issues, Functional Mobility    Impairments Affecting Function (Mobility) pain;strength;balance  -SC     Comment, Safety Issues/Impairments (Mobility) alert following commands  -SC               User Key  (r) = Recorded By, (t) = Taken By, (c) = Cosigned By      Initials Name Provider Type    SC Sarah Garg, PT Physical Therapist                   Mobility       Row Name 23 1119           Bed Mobility    Bed Mobility supine-sit;sit-supine;scooting/bridging  -SC     Scooting/Bridging [place] (Bed Mobility) independent  -SC     Supine-Sit [place] (Bed Mobility) independent  -SC     Sit-Supine [place] (Bed Mobility) independent  -SC     Comment, (Bed Mobility) demonstrated good log roll with cues  -SC       Row Name 07/28/23 1119          Transfers    Comment, (Transfers) demonstrated safe transfers  -[facility] 07/28/23 1119          Sit-Stand Transfer    Sit-Stand [place] (Transfers) independent  -SC       Row Name 07/28/23 1119          Gait/Stairs (Locomotion)    [place] Level (Gait) standby assist  -SC     Distance in Feet (Gait) 400  -SC     Deviations/Abnormal Patterns (Gait) stride length decreased;jeremy decreased;weight shifting decreased  -SC     Comment, (Gait/Stairs) ambulated in hallway with slow careful mobility. Focus on step through pattern. Cane not needed. No LOB noted.  -SC               User Key  (r) = Recorded By, (t) = Taken By, (c) = Cosigned By      Initials Name Provider Type    SC Sarah Grag PT Physical Therapist                   Obj/Interventions       Row Name 07/28/23 1121          Motor Skills    Therapeutic Exercise shoulder;hip;knee;ankle  -SC       Row Name 07/28/23 1121          Shoulder (Therapeutic Exercise)    Shoulder (Therapeutic Exercise) AROM (active range of motion)  -SC     Shoulder AROM (Therapeutic Exercise) bilateral;flexion;extension;supine;10 repetitions  -SC       Row Name 07/28/23 1121          Hip (Therapeutic Exercise)    Hip (Therapeutic Exercise) AROM (active range of motion)  -SC     Hip AROM (Therapeutic Exercise) bilateral;flexion;extension;aBduction;aDduction;10 repetitions;supine  -SC       Row Name 07/28/23 1121          Knee (Therapeutic Exercise)    Knee (Therapeutic Exercise) isometric exercises  -SC     Knee Isometrics (Therapeutic Exercise) bilateral;gluteal sets;quad sets;10 repetitions  -SC        Row Name 07/28/23 1121          Ankle (Therapeutic Exercise)    Ankle (Therapeutic Exercise) AROM (active range of motion)  -SC     Ankle AROM (Therapeutic Exercise) dorsiflexion;plantarflexion;bilateral  -SC       Row Name 07/28/23 1121          Balance    Dynamic Standing Balance standby assist  -SC     Position/Device Used, Standing Balance unsupported  -SC     Comment, Balance no lob  -SC               User Key  (r) = Recorded By, (t) = Taken By, (c) = Cosigned By      Initials Name Provider Type    SC Sarah Garg, PT Physical Therapist                   Goals/Plan       Row Name 07/28/23 1123          Bed Mobility Goal 1 (PT)    Activity/Assistive Device (Bed Mobility Goal 1, PT) sit to supine/supine to sit  -SC     [place] Level/Cues Needed (Bed Mobility Goal 1, PT) modified independence  -SC     Time Frame (Bed Mobility Goal 1, PT) long term goal (LTG);5 days  -SC     Progress/Outcomes (Bed Mobility Goal 1, PT) goal met  -[facility] Name 07/28/23 1123          Transfer Goal 1 (PT)    Activity/Assistive Device (Transfer Goal 1, PT) sit-to-stand/stand-to-sit  -SC     [place] Level/Cues Needed (Transfer Goal 1, PT) modified independence  -SC     Time Frame (Transfer Goal 1, PT) long term goal (LTG);5 days  -SC     Progress/Outcome (Transfer Goal 1, PT) goal met  -[facility] Name 07/28/23 1123          Gait Training Goal 1 (PT)    Activity/Assistive Device (Gait Training Goal 1, PT) gait (walking locomotion)  -SC     [place] Level (Gait Training Goal 1, PT) modified independence  -SC     Distance (Gait Training Goal 1, PT) 500  -SC     Time Frame (Gait Training Goal 1, PT) long term goal (LTG);5 days  -SC     Progress/Outcome (Gait Training Goal 1, PT) goal partially met  -[facility] Name 07/28/23 1123          Stairs Goal 1 (PT)    Activity/Assistive Device (Stairs Goal 1, PT) ascending stairs;descending stairs  -SC     [place] Level/Cues Needed (Stairs Goal 1, PT) modified  independence  -SC     Number of Stairs (Stairs Goal 1, PT) 2  -SC     Time Frame (Stairs Goal 1, PT) long term goal (LTG);5 days  -SC     Progress/Outcome (Stairs Goal 1, PT) goal partially met  -SC               User Key  (r) = Recorded By, (t) = Taken By, (c) = Cosigned By      Initials Name Provider Type    SC Sarah Garg, PT Physical Therapist                   Clinical Impression       [facility] Name 07/28/23 1121          Pain    Additional Documentation Pain Scale: FACES Pre/Post-Treatment (Group)  -SC       Row Name 07/28/23 1121          Pain Scale: FACES Pre/Post-Treatment    Pain: FACES Scale, Pretreatment 2-->hurts little bit  -SC     Posttreatment Pain Rating 4-->hurts little more  -SC     Pain Location - back  -SC     Pre/Posttreatment Pain Comment no leg pain  -SC       Row Name 07/28/23 1121          Plan of Care Review    Plan of Care Reviewed With patient  -SC     Progress improving  -SC     Outcome Evaluation Patient demonstrated safe mobility on [place]. She is going home with her spouse.  -SC       Row Name 07/28/23 1121          Therapy Assessment/Plan (PT)    Patient/Family Therapy Goals Statement (PT) go home  -SC     Rehab Potential (PT) good, to achieve stated therapy goals  -SC     Therapy Frequency (PT) daily  -SC       Row Name 07/28/23 1121          Positioning and Restraints    Pre-Treatment Position in bed  -SC     Post Treatment Position chair  -SC     In Chair notified nsg;reclined;sitting;call light within reach;encouraged to call for assist;with family/caregiver  -SC               User Key  (r) = Recorded By, (t) = Taken By, (c) = Cosigned By      Initials Name Provider Type    SC Sarah Garg, PT Physical Therapist                   Outcome Measures       Row Name 07/28/23 1123 07/28/23 0848       How much help from another person do you currently need...    Turning from your back to your side while in flat bed without using bedrails? 4  -SC 4  -RC    Moving from lying on back to  sitting on the side of a flat bed without bedrails? 4  -SC 4  -RC    Moving to and from a bed to a chair (including a wheelchair)? 4  -SC 4  -RC    Standing up from a chair using your arms (e.g., wheelchair, bedside chair)? 4  -SC 4  -RC    Climbing 3-5 steps with a railing? 3  -SC 3  -RC    To walk in hospital room? 4  -SC 3  -RC    AM-PAC 6 Clicks Score (PT) 23  -SC 22  -    Highest level of mobility 7 --> Walked 25 feet or more  -SC 7 --> Walked 25 feet or more  -      Row Name 07/28/23 1123          Functional Assessment    Outcome Measure Options AM-PAC 6 Clicks Basic Mobility (PT)  -SC               User Key  (r) = Recorded By, (t) = Taken By, (c) = Cosigned By      Initials Name Provider Type    Sarah Burgess, PT Physical Therapist    Xena Lucero, RN Registered Nurse                  Physical Therapy Education       Title: PT OT SLP Therapies (In Progress)       Topic: Physical Therapy (Done)       Point: Mobility training (Done)       Learning Progress Summary             Patient Eager, E,TB,D,H, VU,DU by SC at 7/28/2023 1124    Comment: reviewed back precuaitons and HEP    Acceptance, E,D, VU,DU by  at 7/27/2023 1129                         Point: Home exercise program (Done)       Learning Progress Summary             Patient Eager, E,TB,D,H, VU,DU by SC at 7/28/2023 1124    Comment: reviewed back precuaitons and HEP                         Point: Body mechanics (Done)       Learning Progress Summary             Patient Eager, E,TB,D,H, VU,DU by SC at 7/28/2023 1124    Comment: reviewed back precuaitons and HEP    Acceptance, E,D, VU,DU by  at 7/27/2023 1129                         Point: Precautions (Done)       Learning Progress Summary             Patient Eager, E,TB,D,H, VU,DU by SC at 7/28/2023 1124    Comment: reviewed back precuaitons and HEP    Acceptance, E,D, VU,DU by  at 7/27/2023 1129                                         User Key       Initials Effective Dates Name  Provider Type Discipline    SC 02/03/23 -  Sarah Garg PT Physical Therapist PT     06/01/21 -  Evelyne Draper PT Physical Therapist PT                  PT Recommendation and Plan     Plan of Care Reviewed With: patient  Progress: improving  Outcome Evaluation: Patient demonstrated safe mobility on castro. She is going home with her spouse.     Time Calculation:         PT Charges       Row Name 07/28/23 1054             Time Calculation    Start Time 1054  -SC      PT Received On 07/28/23  -SC         Timed Charges    05670 - PT Therapeutic Exercise Minutes 8  -SC      01553 - Gait Training Minutes  10  -SC      61149 - PT Therapeutic Activity Minutes 3  -SC         Total Minutes    Timed Charges Total Minutes 21  -SC       Total Minutes 21  -SC                User Key  (r) = Recorded By, (t) = Taken By, (c) = Cosigned By      Initials Name Provider Type    SC Sarah Garg PT Physical Therapist                  Therapy Charges for Today       Code Description Service Date Service Provider Modifiers Qty    [id number] HC GAIT TRAINING EA 15 MIN 7/28/2023 Sarah Garg PT GP 1            PT G-Codes  Outcome Measure Options: AM-PAC 6 Clicks Basic Mobility (PT)  AM-PAC 6 Clicks Score (PT): 23  AM-PAC 6 Clicks Score (OT): 21    PT Discharge Summary  Anticipated Discharge Disposition (PT): home with assist    Sarah Garg PT  7/28/2023

## 2023-07-28 NOTE — PLAN OF CARE
Goal Outcome Evaluation:                        Problem: Adult Inpatient Plan of Care  Goal: Absence of Hospital-Acquired Illness or Injury  Intervention: Identify and Manage Fall Risk  Recent Flowsheet Documentation  Taken 7/28/2023 0200 by Hermes Angel RN  Safety Promotion/Fall Prevention:   activity supervised   safety round/check completed   toileting scheduled  Taken 7/28/2023 0000 by Hermes Angel RN  Safety Promotion/Fall Prevention:   activity supervised   safety round/check completed   toileting scheduled  Taken 7/27/2023 2200 by Hermes Angel RN  Safety Promotion/Fall Prevention:   safety round/check completed   toileting scheduled  Taken 7/27/2023 2000 by Hermes Angel RN  Safety Promotion/Fall Prevention:   activity supervised   safety round/check completed   toileting scheduled  Intervention: Prevent Skin Injury  Recent Flowsheet Documentation  Taken 7/28/2023 0200 by Hermes Angel RN  Body Position: supine  Taken 7/28/2023 0000 by Hermes Angel RN  Body Position: supine  Taken 7/27/2023 2200 by Hermes Angel RN  Body Position: supine  Taken 7/27/2023 2000 by Hermes Angel RN  Body Position: supine  Skin Protection: adhesive use limited  Intervention: Prevent and Manage VTE (Venous Thromboembolism) Risk  Recent Flowsheet Documentation  Taken 7/28/2023 0200 by Hermes Angel RN  VTE Prevention/Management:   bilateral   sequential compression devices on  Taken 7/28/2023 0000 by Hermes Angel RN  VTE Prevention/Management:   bilateral   sequential compression devices on  Taken 7/27/2023 2200 by Hermes Angel RN  VTE Prevention/Management:   bilateral   sequential compression devices on  Taken 7/27/2023 2000 by Hermes Angel RN  VTE Prevention/Management:   bilateral   sequential compression devices on  Intervention: Prevent Infection  Recent Flowsheet Documentation  Taken 7/28/2023 0200 by Hermes Angel RN  Infection Prevention: environmental surveillance  performed  Taken 7/28/2023 0000 by Hermes Angel RN  Infection Prevention: environmental surveillance performed  Taken 7/27/2023 2200 by Hermes Angel RN  Infection Prevention: environmental surveillance performed  Taken 7/27/2023 2000 by Hermes Angel RN  Infection Prevention: environmental surveillance performed  Goal: Optimal Comfort and Wellbeing  Intervention: Monitor Pain and Promote Comfort  Recent Flowsheet Documentation  Taken 7/28/2023 0200 by Hermes Angel RN  Pain Management Interventions: quiet environment facilitated  Taken 7/28/2023 0000 by Hermes Angel RN  Pain Management Interventions: quiet environment facilitated  Taken 7/27/2023 2200 by Hermes Angel RN  Pain Management Interventions: quiet environment facilitated  Taken 7/27/2023 2000 by Hermes Angel RN  Pain Management Interventions: quiet environment facilitated  Intervention: Provide Person-Centered Care  Recent Flowsheet Documentation  Taken 7/28/2023 0200 by Hermes Angel RN  Trust Relationship/Rapport: care explained  Taken 7/28/2023 0000 by Hermes Angel RN  Trust Relationship/Rapport: care explained  Taken 7/27/2023 2200 by Hermes Angel RN  Trust Relationship/Rapport: care explained  Taken 7/27/2023 2000 by Hermes Angel RN  Trust Relationship/Rapport: care explained     VSS, voids well, rested throughout the night, ambulates with assistance, will continue to monitor for changes.

## 2023-08-11 ENCOUNTER — NURSE TRIAGE (OUTPATIENT)
Dept: CALL CENTER | Facility: HOSPITAL | Age: 68
End: 2023-08-11
Payer: COMMERCIAL

## 2023-08-11 NOTE — TELEPHONE ENCOUNTER
Patient wants to know if she needs to keep a dressing on her incision still. Surgery with Dr. Mcguire 07/26. Patient denies any drainage, reports incision appears well approximated and healing. Reviewed patient's chart. Advised that she does not need to keep a dressing on incision.    Reason for Disposition   Surgical incision after sutures or staples removed, questions about    Additional Information   Negative: [1] Major abdominal surgical incision AND [2] wound gaping open AND [3] visible internal organs   Negative: Sounds like a life-threatening emergency to the triager   Negative: Patient has a Negative Pressure Wound Therapy device   Negative: Patient is followed by a wound clinic or wound specialist for this wound   Negative: [1] Bleeding from incision AND [2] won't stop after 10 minutes of direct pressure   Negative: [1] Bleeding (more than a few drops) from incision AND [2] tracheostomy or blood vessel surgery (e.g., carotid endarterectomy, femoral bypass graft, kidney dialysis fistula)   Negative: [1] Widespread rash AND [2] bright red, sunburn-like   Negative: Severe pain in the incision   Negative: [1] Incision gaping open AND [2] < 48 hours since wound re-opened   Negative: [1] Incision gaping open AND [2] length of opening > 2 inches (5 cm)   Negative: Patient sounds very sick or weak to the triager   Negative: Sounds like a serious complication to the triager   Negative: Fever > 100.4 F (38.0 C)   Negative: [1] Incision looks infected (spreading redness, pain) AND [2] fever > 99.5 F (37.5 C)   Negative: [1] Incision looks infected (spreading redness, pain) AND [2] large red area (> 2 in. or 5 cm)   Negative: [1] Incision looks infected (spreading redness, pain) AND [2] face wound   Negative: [1] Red streak runs from the incision AND [2] longer than 1 inch (2.5 cm)   Negative: [1] Pus or bad-smelling fluid draining from incision AND [2] no fever   Negative: [1] Post-op pain AND [2] not controlled with  "pain medications   Negative: Dressing soaked with blood or body fluid (e.g., drainage)   Negative: [1] Scant bleeding (e.g., few drops) from incision AND AND [2] tracheostomy or blood vessel surgery (e.g., carotid endarterectomy, femoral bypass graft, kidney dialysis fistula)   Negative: [1] Raised bruise and [2] size > 2 inches (5 cm) and expanding   Negative: [1] Caller has URGENT question AND [2] triager unable to answer question   Negative: [1] INCREASING pain in incision AND [2] > 2 days (48 hours) since surgery   Negative: [1] Small red area or streak AND [2] no fever   Negative: [1] Clear or blood-tinged fluid draining from wound AND [2] no fever   Negative: [1] Incision gaping open AND [2] > 48 hours since wound re-opened AND [3] length of opening > 1/2 inch (12 mm)   Negative: [1] Incision on face gaping open AND [2] > 48 hours since wound re-opened AND [3] length of opening > 1/4 inch (6 mm)   Negative: Suture or staple removal is overdue   Negative: [1] Suture or staple came out early AND [2] caller wants wound checked   Negative: [1] Caller has NON-URGENT question AND [2] triager unable to answer question   Negative: Pimple where a stitch comes through the skin   Negative: Suture (or staple) came out early   Negative: Mild bruising near incision site   Negative: Suture removal date, questions about   Negative: Skin tape (e.g., Steri-Strips) removal, questions about   Negative: Sutured or stapled surgical incision, questions about    Answer Assessment - Initial Assessment Questions  1. SYMPTOM: \"What's the main symptom you're concerned about?\" (e.g., drainage, incision opening up, pain, redness)      NA  2. ONSET: \"When did NA  start?\"      NA  3. SURGERY: \"What surgery did you have?\"      See chart  4. DATE of SURGERY: \"When was the surgery?\"       07/26  5. INCISION SITE: \"Where is the incision located?\"       Back   6. REDNESS: \"Is there any redness at the incision site?\" If Yes, ask: \"How wide across " "is the redness?\" (Inches, centimeters)       No redness  7. PAIN: \"Is there any pain?\" If Yes, ask: \"How bad is it?\"  (Scale 1-10; or mild, moderate, severe)    - NONE (0): no pain    - MILD (1-3): doesn't interfere with normal activities     - MODERATE (4-7): interferes with normal activities or awakens from sleep     - SEVERE (8-10): excruciating pain, unable to do any normal activities      No pain  8. BLEEDING: \"Is there any bleeding?\" If Yes, ask: \"How much?\" and \"Where?\"      No bleeding  9. DRAINAGE: \"Is there any drainage from the incision site?\" If Yes, ask: \"What color and how much?\" (e.g., red, cloudy, pus; drops, teaspoon)      No drainage  10. FEVER: \"Do you have a fever?\" If Yes, ask: \"What is your temperature, how was it measured, and when did it start?\"        No fever  11. OTHER SYMPTOMS: \"Do you have any other symptoms?\" (e.g., dizziness, rash elsewhere on body, shaking chills, weakness)        No other symptoms    Protocols used: Post-Op Incision Symptoms and Questions-ADULT-    "

## 2023-08-14 ENCOUNTER — HOSPITAL ENCOUNTER (OUTPATIENT)
Dept: GENERAL RADIOLOGY | Facility: HOSPITAL | Age: 68
Discharge: HOME | End: 2023-08-14
Admitting: NEUROLOGICAL SURGERY
Payer: COMMERCIAL

## 2023-08-14 DIAGNOSIS — Z98.1 S/P LUMBAR FUSION: ICD-10-CM

## 2023-08-14 PROCEDURE — 72100 X-RAY EXAM L-S SPINE 2/3 VWS: CPT

## 2023-08-18 ENCOUNTER — OFFICE VISIT (OUTPATIENT)
Dept: NEUROSURGERY | Facility: CLINIC | Age: 68
End: 2023-08-18
Payer: COMMERCIAL

## 2023-08-18 VITALS
SYSTOLIC BLOOD PRESSURE: 140 MMHG | WEIGHT: 179 LBS | BODY MASS INDEX: 31.71 KG/M2 | DIASTOLIC BLOOD PRESSURE: 80 MMHG | HEIGHT: 63 IN | TEMPERATURE: 97.5 F

## 2023-08-18 DIAGNOSIS — M43.16 SPONDYLOLISTHESIS OF LUMBAR REGION: ICD-10-CM

## 2023-08-18 DIAGNOSIS — M48.062 SPINAL STENOSIS OF LUMBAR REGION WITH NEUROGENIC CLAUDICATION: ICD-10-CM

## 2023-08-18 DIAGNOSIS — Z98.1 S/P LUMBAR FUSION: Primary | ICD-10-CM

## 2023-08-18 PROCEDURE — 99024 POSTOP FOLLOW-UP VISIT: CPT

## 2023-08-18 NOTE — PROGRESS NOTES
Office Note     Name: Refugio Bañuelos    : 1955     MRN: [id number]     PCP: Jelani Dahl MD    Chief Complaint  Low back and left leg pain    Subjective     History of Present Illness:  Ms. 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  In , she underwent lumbar surgery by Dr. Clarke.  She suffered from occasional low back pain.  In 2022 she had constant pain in the left dorsal hip and buttock region extending down the back of her left leg into the bottom of her left foot.  She tried numerous pharmacological and conservative measures to no avail.  Symptoms were better when she sat down.  Studies demonstrated high-grade stenosis with spondylolisthesis and migue significant instability.  As such, she underwent L4-5 decompression with fusion and stabilization by Dr. Mcguire without complications on 2023.    She reports today for 3 postoperative incisional check.  Preoperative symptoms have subsided.  She reports that she has experienced little to no pain following her procedure.  She has plans to slowly increase her activity by walking and swimming.  Left hip and buttock pain she was suffering preoperatively have subsided.  Overall she is very pleased.    Review of Systems:   Review of Systems    The patient's past medical history, past surgical history, family history, and social history have been reviewed at length in the electronic medical record.       Past Medical History:   Past Medical History:   Diagnosis Date    Arthritis     Elevated cholesterol     Hypertension     Low back pain     PONV (postoperative nausea and vomiting)     Spinal headache     Thyroid disease        Past Surgical History:   Past Surgical History:   Procedure Laterality Date    APPENDECTOMY  1982    CARPAL TUNNEL RELEASE Bilateral      SECTION      COLONOSCOPY  2023    22 polyps removed per pt    LAPAROSCOPIC TUBAL LIGATION      LUMBAR LAMINECTOMY DISCECTOMY DECOMPRESSION N/A  7/26/2023    Procedure: LUMBAR FUSION DECOMPRESSON WITH PEDICLE SCREWS L4-5;  Surgeon: Edgar Mcguire MD;  Location: [facility];  Service: Neurosurgery;  Laterality: N/A;    LUMBAR SPINE SURGERY  1993    L5/S1 decompression, Dr. Clarke [facility]       Family History:   Family History   Problem Relation Age of Onset    Thyroid disease Mother     Kidney disease Mother     Hypertension Mother     Arthritis Mother     Heart disease Mother     Heart disease Father        Social History:   Social History     Socioeconomic History    Marital status:    Tobacco Use    Smoking status: Every Day     Packs/day: 0.50     Years: 20.00     Pack years: 10.00     Types: Cigarettes    Smokeless tobacco: Never   Vaping Use    Vaping Use: Never used   Substance and Sexual Activity    Alcohol use: Never    Drug use: Never    Sexual activity: Defer       Immunizations:   Immunization History   Administered Date(s) Administered    COVID-19 (MODERNA) 1st,2nd,3rd Dose Monovalent 03/05/2021, 04/07/2021    COVID-19 (MODERNA) BIVALENT 12+YRS 10/28/2022    COVID-19 (MODERNA) Monovalent Original Booster 10/31/2021, 07/21/2022        Medications:     Current Outpatient Medications:     acetaminophen (TYLENOL) 650 MG 8 hr tablet, Take 1 tablet by mouth Every 8 (Eight) Hours As Needed for Mild Pain., Disp: , Rfl:     Cholecalciferol (Vitamin D3) 1.25 MG (69629 UT) capsule, Take 1 capsule by mouth 1 (One) Time Per Week., Disp: , Rfl:     levothyroxine (SYNTHROID, LEVOTHROID) 88 MCG tablet, Take 1 tablet by mouth Daily., Disp: , Rfl:     lisinopril-hydrochlorothiazide (PRINZIDE,ZESTORETIC) 20-12.5 MG per tablet, Take 1 tablet by mouth Every 12 (Twelve) Hours., Disp: , Rfl:     multivitamin with minerals tablet tablet, Take 1 tablet by mouth Daily., Disp: , Rfl:     naproxen sodium (ALEVE) 220 MG tablet, Take 1 tablet by mouth 2 (Two) Times a Day As Needed., Disp: , Rfl:     rosuvastatin (CRESTOR) 10 MG tablet, Take 1 tablet by  "mouth Every Night., Disp: , Rfl:     Allergies:   Allergies   Allergen Reactions    Penicillins Swelling       Objective     Vital Signs  Ht 160 cm (63\")   Wt 81.2 kg (179 lb)   BMI 31.71 kg/mý   Estimated body mass index is 31.71 kg/mý as calculated from the following:    Height as of this encounter: 160 cm (63\").    Weight as of this encounter: 81.2 kg (179 lb).    Physical Exam   Constitutional: Patient is well-developed and appears stated age. Pleasant and   cooperative. Appears in no acute distress.   Incision: Lumbar incision has healed nicely.    Tobacco Use: High Risk    Smoking Tobacco Use: Every Day    Smokeless Tobacco Use: Never    Passive Exposure: Not on file        Body mass index is 31.71 kg/mý.    Fall Risk Assessment was completed, and patient is at low risk for falls.        Assessment and Plan     Medical Decision Making     Data Review:   (All imaging independently reviewed unless stated otherwise.)   Plain films of the lumbar spine dated 8/14/2023 demonstrate good construct of her previous fusion at the L4-5 level.  There is no hardware complications or malalignment.  Normal anatomical alignment.    Diagnosis:  1.  Lumbar stenosis with neurogenic claudication  2.  L4-5 spondylolisthesis with pronounced instability.    Treatment Options:   Ms. Bañuelos presents today for 3 postoperative incision check.  Preoperative symptoms have subsided she is doing very well.  Pain is minimal.  She has increased activity as tolerated.  We discussed general do's and don'ts today.  Overall she is very pleased with the progress and thankful for our services.  She will follow-up in 6 to 8 weeks with Dr. Mcguire to check on her progress.         Diagnosis Plan   1. S/P lumbar fusion        2. Spinal stenosis of lumbar region with neurogenic claudication        3. Spondylolisthesis of lumbar region                Hemanth Chandra PA-C  [facility]  [facility]  [address] "  [address] [id number] [phone]

## 2023-10-17 ENCOUNTER — OFFICE VISIT (OUTPATIENT)
Dept: NEUROSURGERY | Facility: CLINIC | Age: 68
End: 2023-10-17
Payer: COMMERCIAL

## 2023-10-17 VITALS — HEIGHT: 63 IN | WEIGHT: 182.8 LBS | TEMPERATURE: 98.2 F | BODY MASS INDEX: 32.39 KG/M2

## 2023-10-17 DIAGNOSIS — Z98.1 S/P LUMBAR FUSION: Primary | ICD-10-CM

## 2023-10-17 DIAGNOSIS — M48.062 SPINAL STENOSIS OF LUMBAR REGION WITH NEUROGENIC CLAUDICATION: ICD-10-CM

## 2023-10-17 DIAGNOSIS — M43.16 SPONDYLOLISTHESIS OF LUMBAR REGION: ICD-10-CM

## 2023-10-17 PROCEDURE — 99024 POSTOP FOLLOW-UP VISIT: CPT | Performed by: NEUROLOGICAL SURGERY

## 2023-10-17 NOTE — PROGRESS NOTES
Patient: Refugio Bañuelos  : 1955    Primary Care Provider: Jelani Dahl MD    Requesting Provider: As above        History    Chief Complaint: Low back and left lower extremity pain.    History of Present Illness: Ms. 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  In , she underwent lumbar surgery by Dr. Clarke.  She suffered from occasional low back pain.  In 2022 she had constant pain in the left dorsal hip and buttock region extending down the back of her left leg into the bottom of her left foot.  She tried numerous pharmacological and conservative measures to no avail.  Symptoms were better when she sat down.  Studies demonstrated high-grade stenosis with spondylolisthesis and migue significant instability.  As such, she underwent L4-5 decompression with fusion and stabilization on 2023.  He is doing great.  She has very minimal pain at this point.  She has recently returned from a trip to [facility] where she could be quite mobile.       Review of Systems   Constitutional:  Negative for activity change, appetite change, chills, diaphoresis, fatigue, fever and unexpected weight change.   HENT:  Negative for congestion, dental problem, drooling, ear discharge, ear pain, facial swelling, hearing loss, mouth sores, nosebleeds, postnasal drip, rhinorrhea, sinus pressure, sinus pain, sneezing, sore throat, tinnitus, trouble swallowing and voice change.    Eyes:  Negative for photophobia, pain, discharge, redness, itching and visual disturbance.   Respiratory:  Negative for apnea, cough, choking, chest tightness, shortness of breath, wheezing and stridor.    Cardiovascular:  Negative for chest pain, palpitations and leg swelling.   Gastrointestinal:  Negative for abdominal distention, abdominal pain, anal bleeding, blood in stool, constipation, diarrhea, nausea, rectal pain and vomiting.   Endocrine: Negative for cold intolerance, heat intolerance, polydipsia,  "polyphagia and polyuria.   Genitourinary:  Negative for decreased urine volume, difficulty urinating, dyspareunia, dysuria, enuresis, flank pain, frequency, genital sores, hematuria, menstrual problem, pelvic pain, urgency, vaginal bleeding, vaginal discharge and vaginal pain.   Musculoskeletal:  Negative for arthralgias, back pain, gait problem, joint swelling, myalgias, neck pain and neck stiffness.   Skin:  Negative for color change, pallor, rash and wound.   Allergic/Immunologic: Negative for environmental allergies, food allergies and immunocompromised state.   Neurological:  Negative for dizziness, tremors, seizures, syncope, facial asymmetry, speech difficulty, weakness, light-headedness, numbness and headaches.   Hematological:  Negative for adenopathy. Does not bruise/bleed easily.   Psychiatric/Behavioral:  Negative for agitation, behavioral problems, confusion, decreased concentration, dysphoric mood, hallucinations, self-injury, sleep disturbance and suicidal ideas. The patient is not nervous/anxious and is not hyperactive.        The patient's past medical history, past surgical history, family history, and social history have been reviewed at length in the electronic medical record.      Physical Exam:   Temp 98.2 °F (36.8 °C) (Infrared)   Ht 160 cm (63\")   Wt 82.9 kg (182 lb 12.8 oz)   BMI 32.38 kg/m²   Lumbar incision looks great.    Medical Decision Making    Diagnosis:   L4-5 spondylolisthesis with instability and stenosis status post decompression with fusion and stabilization.    Treatment Options:   Ms. Bañuelos is doing great.  She will follow-up in our clinic in 4 months with new plain films of the lumbar spine.      Scribed for Edgar Mcguire MD by Chinyere Bentley CMA on 10/17/2023 09:43 EDT       I, Dr. Mcguire, personally performed the services described in the documentation, as scribed in my presence, and it is both accurate and complete.  "

## 2024-02-05 ENCOUNTER — HOSPITAL ENCOUNTER (OUTPATIENT)
Dept: GENERAL RADIOLOGY | Facility: HOSPITAL | Age: 69
Discharge: HOME | End: 2024-02-05
Admitting: NEUROLOGICAL SURGERY
Payer: COMMERCIAL

## 2024-02-05 DIAGNOSIS — Z98.1 S/P LUMBAR FUSION: ICD-10-CM

## 2024-02-05 PROCEDURE — 72100 X-RAY EXAM L-S SPINE 2/3 VWS: CPT

## 2024-02-06 ENCOUNTER — OFFICE VISIT (OUTPATIENT)
Dept: NEUROSURGERY | Facility: CLINIC | Age: 69
End: 2024-02-06
Payer: COMMERCIAL

## 2024-02-06 VITALS
HEIGHT: 63 IN | SYSTOLIC BLOOD PRESSURE: 120 MMHG | DIASTOLIC BLOOD PRESSURE: 68 MMHG | TEMPERATURE: 97.7 F | BODY MASS INDEX: 32.71 KG/M2 | WEIGHT: 184.6 LBS

## 2024-02-06 DIAGNOSIS — M48.062 SPINAL STENOSIS OF LUMBAR REGION WITH NEUROGENIC CLAUDICATION: Primary | ICD-10-CM

## 2024-02-06 DIAGNOSIS — M79.605 LEFT LEG PAIN: ICD-10-CM

## 2024-02-06 DIAGNOSIS — M43.16 SPONDYLOLISTHESIS OF LUMBAR REGION: ICD-10-CM

## 2024-02-06 DIAGNOSIS — M51.36 DDD (DEGENERATIVE DISC DISEASE), LUMBAR: ICD-10-CM

## 2024-02-06 DIAGNOSIS — Z98.890 HISTORY OF LUMBAR SURGERY: ICD-10-CM

## 2024-02-06 PROCEDURE — 99213 OFFICE O/P EST LOW 20 MIN: CPT | Performed by: PHYSICIAN ASSISTANT

## 2024-02-06 NOTE — PROGRESS NOTES
Patient: Refugio Bañuelos  : 1955  Chart #: [id number]    Date of Service: 2024    CHIEF COMPLAINT: Low back and left lower extremity pain.     History of Present Illness Ms. Bañuelos is seen in follow-up.  She is a 68-year-old .  In , she underwent lumbar surgery with Dr. Clarke.  In  she presented with constant pain in the left dorsal hip and buttock extending down the back of her left leg into the bottom of her feet.  Ultimately on 2023 she underwent decompression, fusion and stabilization L4-5.  Postoperatively she has done excellent.  She has had minimal pain.  She has been to [place] and hiked without difficulty. She is planning to head to [place] in the next month where there will be quite a bit of walking.       Past Medical History:   Diagnosis Date    Arthritis     Elevated cholesterol     Hypertension     Low back pain     PONV (postoperative nausea and vomiting)     Spinal headache     Thyroid disease          Current Outpatient Medications:     levothyroxine (SYNTHROID, LEVOTHROID) 88 MCG tablet, Take 1 tablet by mouth Daily., Disp: , Rfl:     lisinopril-hydrochlorothiazide (PRINZIDE,ZESTORETIC) 20-12.5 MG per tablet, Take 1 tablet by mouth Every 12 (Twelve) Hours., Disp: , Rfl:     multivitamin with minerals tablet tablet, Take 1 tablet by mouth Daily., Disp: , Rfl:     naproxen sodium (ALEVE) 220 MG tablet, Take 1 tablet by mouth 2 (Two) Times a Day As Needed., Disp: , Rfl:     rosuvastatin (CRESTOR) 10 MG tablet, Take 1 tablet by mouth Every Night., Disp: , Rfl:     Past Surgical History:   Procedure Laterality Date    APPENDECTOMY  1982    BACK SURGERY      CARPAL TUNNEL RELEASE Bilateral      SECTION      COLONOSCOPY  2023    22 polyps removed per pt    LAPAROSCOPIC TUBAL LIGATION      LUMBAR LAMINECTOMY DISCECTOMY DECOMPRESSION N/A 2023    Procedure: LUMBAR FUSION DECOMPRESSON WITH PEDICLE SCREWS L4-5;  Surgeon: Edgar Mcguire  MD VILMA;  Location: [facility];  Service: Neurosurgery;  Laterality: N/A;    LUMBAR SPINE SURGERY  1993    L5/S1 decompression, Dr. Clarke [facility]    SPINAL FUSION         Social History     Socioeconomic History    Marital status:    Tobacco Use    Smoking status: Every Day     Packs/day: 0.50     Years: 20.00     Additional pack years: 0.00     Total pack years: 10.00     Types: Cigarettes    Smokeless tobacco: Never   Vaping Use    Vaping Use: Never used   Substance and Sexual Activity    Alcohol use: Never    Drug use: Never    Sexual activity: Yes     Partners: Male         Review of Systems   Constitutional:  Negative for activity change, appetite change, chills, diaphoresis, fatigue, fever and unexpected weight change.   HENT:  Negative for congestion, dental problem, drooling, ear discharge, ear pain, facial swelling, hearing loss, mouth sores, nosebleeds, postnasal drip, rhinorrhea, sinus pressure, sinus pain, sneezing, sore throat, tinnitus, trouble swallowing and voice change.    Eyes:  Negative for photophobia, pain, discharge, redness, itching and visual disturbance.   Respiratory:  Negative for apnea, cough, choking, chest tightness, shortness of breath, wheezing and stridor.    Cardiovascular:  Negative for chest pain, palpitations and leg swelling.   Gastrointestinal:  Negative for abdominal distention, abdominal pain, anal bleeding, blood in stool, constipation, diarrhea, nausea, rectal pain and vomiting.   Endocrine: Negative for cold intolerance, heat intolerance, polydipsia, polyphagia and polyuria.   Genitourinary:  Negative for decreased urine volume, difficulty urinating, dyspareunia, dysuria, enuresis, flank pain, frequency, genital sores, hematuria, menstrual problem, pelvic pain, urgency, vaginal bleeding, vaginal discharge and vaginal pain.   Musculoskeletal:  Negative for arthralgias, back pain, gait problem, joint swelling, myalgias, neck pain and neck stiffness.   Skin:   "Negative for color change, pallor, rash and wound.   Allergic/Immunologic: Negative for environmental allergies, food allergies and immunocompromised state.   Neurological:  Negative for dizziness, tremors, seizures, syncope, facial asymmetry, speech difficulty, weakness, light-headedness, numbness and headaches.   Hematological:  Negative for adenopathy. Does not bruise/bleed easily.   Psychiatric/Behavioral:  Negative for agitation, behavioral problems, confusion, decreased concentration, dysphoric mood, hallucinations, self-injury, sleep disturbance and suicidal ideas. The patient is not nervous/anxious and is not hyperactive.        Objective   Vital Signs: Blood pressure 120/68, temperature 97.7 °F (36.5 °C), temperature source Infrared, height 160 cm (63\"), weight 83.7 kg (184 lb 9.6 oz).  Physical Exam  Vitals and nursing note reviewed.   Constitutional:       General: She is not in acute distress.     Appearance: She is well-developed.   HENT:      Head: Normocephalic and atraumatic.   Psychiatric:         Behavior: Behavior normal.         Thought Content: Thought content normal.     Musculoskeletal:     Strength is intact in upper and lower extremities to direct testing.     Station and gait are normal.  Neurologic:     Muscle tone is normal throughout.     Coordination is intact.     Sensation is intact to light touch throughout.     Patient is oriented to person, place, and time.       Independent review of radiographic imaging: Plain films of the lumbar spine demonstrate intact hardware L4-5. Degenerative disc at L5-S1    Assessment & Plan   Diagnosis: L4-5 spondylolisthesis with instability and stenosis status post decompression with fusion and stabilization    Medical Decision Making: Ms Bañuelos is doing well. I reviewed her xrays with her in the room. She has another trip planned that will require a fair amount of walking. I am pleased with her progress. I encouraged smoking cessation as if pertains " to her bone fusion and over all wellbeing. Follow up in 6 months with plain films of the spine to assess fusion.           Diagnoses and all orders for this visit:    1. Spinal stenosis of lumbar region with neurogenic claudication (Primary)  -     XR Spine Lumbar AP & Lateral; Future    2. Spondylolisthesis of lumbar region    3. Left leg pain    4. History of lumbar surgery    5. DDD (degenerative disc disease), lumbar                                  Rukhsana Zelaya PA-C  Patient Care Team:  Jelani Dahl MD as PCP - General (Physician Assistant)  Edgar Mcguire MD as Consulting Physician (Neurosurgery)

## 2024-07-09 ENCOUNTER — OFFICE VISIT (OUTPATIENT)
Dept: NEUROSURGERY | Facility: CLINIC | Age: 69
End: 2024-07-09
Payer: COMMERCIAL

## 2024-07-09 ENCOUNTER — HOSPITAL ENCOUNTER (OUTPATIENT)
Dept: GENERAL RADIOLOGY | Facility: HOSPITAL | Age: 69
Discharge: HOME | End: 2024-07-09
Admitting: PHYSICIAN ASSISTANT
Payer: COMMERCIAL

## 2024-07-09 VITALS
WEIGHT: 185.5 LBS | HEIGHT: 63 IN | TEMPERATURE: 97.8 F | DIASTOLIC BLOOD PRESSURE: 64 MMHG | BODY MASS INDEX: 32.87 KG/M2 | SYSTOLIC BLOOD PRESSURE: 132 MMHG

## 2024-07-09 DIAGNOSIS — M48.062 SPINAL STENOSIS OF LUMBAR REGION WITH NEUROGENIC CLAUDICATION: ICD-10-CM

## 2024-07-09 DIAGNOSIS — M48.062 SPINAL STENOSIS OF LUMBAR REGION WITH NEUROGENIC CLAUDICATION: Primary | ICD-10-CM

## 2024-07-09 DIAGNOSIS — M43.16 SPONDYLOLISTHESIS OF LUMBAR REGION: ICD-10-CM

## 2024-07-09 DIAGNOSIS — M51.36 DDD (DEGENERATIVE DISC DISEASE), LUMBAR: ICD-10-CM

## 2024-07-09 DIAGNOSIS — M79.605 LEFT LEG PAIN: ICD-10-CM

## 2024-07-09 PROCEDURE — 72100 X-RAY EXAM L-S SPINE 2/3 VWS: CPT

## 2024-07-09 PROCEDURE — 99213 OFFICE O/P EST LOW 20 MIN: CPT | Performed by: PHYSICIAN ASSISTANT

## 2024-07-09 NOTE — PROGRESS NOTES
Patient: Refugio Bañuelos  : 1955  Chart #: [id number]    Date of Service: 2024    CHIEF COMPLAINT: Low back and left lower extremity pain.     History of Present Illness Ms. Bañuelos is seen in follow-up.  She is a 68-year-old .  In , she underwent lumbar surgery with Dr. Clarke.  In  she presented with constant pain in the left dorsal hip and buttock extending down the back of her left leg into the bottom of her feet.  Ultimately on 2023 she underwent decompression, fusion and stabilization L4-5.  Postoperatively she has done excellent.  She has had minimal pain.  She has been to [place] and hiked without difficulty.        Past Medical History:   Diagnosis Date    Arthritis     Elevated cholesterol     Hypertension     Low back pain     PONV (postoperative nausea and vomiting)     Spinal headache     Thyroid disease          Current Outpatient Medications:     levothyroxine (SYNTHROID, LEVOTHROID) 88 MCG tablet, Take 1 tablet by mouth Daily., Disp: , Rfl:     lisinopril-hydrochlorothiazide (PRINZIDE,ZESTORETIC) 20-12.5 MG per tablet, Take 1 tablet by mouth Every 12 (Twelve) Hours., Disp: , Rfl:     multivitamin with minerals tablet tablet, Take 1 tablet by mouth Daily., Disp: , Rfl:     naproxen sodium (ALEVE) 220 MG tablet, Take 1 tablet by mouth 2 (Two) Times a Day As Needed., Disp: , Rfl:     rosuvastatin (CRESTOR) 10 MG tablet, Take 1 tablet by mouth Every Night., Disp: , Rfl:     Past Surgical History:   Procedure Laterality Date    APPENDECTOMY  1982    BACK SURGERY      CARPAL TUNNEL RELEASE Bilateral      SECTION      COLONOSCOPY  2023    22 polyps removed per pt    LAPAROSCOPIC TUBAL LIGATION      LUMBAR LAMINECTOMY DISCECTOMY DECOMPRESSION N/A 2023    Procedure: LUMBAR FUSION DECOMPRESSON WITH PEDICLE SCREWS L4-5;  Surgeon: Edgar Mcguire MD;  Location: [facility];  Service: Neurosurgery;  Laterality: N/A;    LUMBAR SPINE SURGERY       L5/S1 decompression, Dr. Clarke  [facility]    SPINAL FUSION         Social History     Socioeconomic History    Marital status:    Tobacco Use    Smoking status: Every Day     Current packs/day: 0.50     Average packs/day: 0.5 packs/day for 20.0 years (10.0 ttl pk-yrs)     Types: Cigarettes    Smokeless tobacco: Never   Vaping Use    Vaping status: Never Used   Substance and Sexual Activity    Alcohol use: Never    Drug use: Never    Sexual activity: Yes     Partners: Male         Review of Systems   Constitutional:  Negative for activity change, appetite change, chills, diaphoresis, fatigue, fever and unexpected weight change.   HENT:  Negative for congestion, dental problem, drooling, ear discharge, ear pain, facial swelling, hearing loss, mouth sores, nosebleeds, postnasal drip, rhinorrhea, sinus pressure, sinus pain, sneezing, sore throat, tinnitus, trouble swallowing and voice change.    Eyes:  Negative for photophobia, pain, discharge, redness, itching and visual disturbance.   Respiratory:  Negative for apnea, cough, choking, chest tightness, shortness of breath, wheezing and stridor.    Cardiovascular:  Negative for chest pain, palpitations and leg swelling.   Gastrointestinal:  Negative for abdominal distention, abdominal pain, anal bleeding, blood in stool, constipation, diarrhea, nausea, rectal pain and vomiting.   Endocrine: Negative for cold intolerance, heat intolerance, polydipsia, polyphagia and polyuria.   Genitourinary:  Negative for decreased urine volume, difficulty urinating, dyspareunia, dysuria, enuresis, flank pain, frequency, genital sores, hematuria, menstrual problem, pelvic pain, urgency, vaginal bleeding, vaginal discharge and vaginal pain.   Musculoskeletal:  Negative for arthralgias, back pain, gait problem, joint swelling, myalgias, neck pain and neck stiffness.   Skin:  Negative for color change, pallor, rash and wound.   Allergic/Immunologic: Negative for  "environmental allergies, food allergies and immunocompromised state.   Neurological:  Negative for dizziness, tremors, seizures, syncope, facial asymmetry, speech difficulty, weakness, light-headedness, numbness and headaches.   Hematological:  Negative for adenopathy. Does not bruise/bleed easily.   Psychiatric/Behavioral:  Negative for agitation, behavioral problems, confusion, decreased concentration, dysphoric mood, hallucinations, self-injury, sleep disturbance and suicidal ideas. The patient is not nervous/anxious and is not hyperactive.        Objective   Vital Signs: Blood pressure 132/64, temperature 97.8 °F (36.6 °C), temperature source Infrared, height 160 cm (63\"), weight 84.1 kg (185 lb 8 oz).  Physical Exam  Vitals and nursing note reviewed.   Constitutional:       General: She is not in acute distress.     Appearance: She is well-developed.   HENT:      Head: Normocephalic and atraumatic.   Psychiatric:         Behavior: Behavior normal.         Thought Content: Thought content normal.     Musculoskeletal:     Strength is intact in upper and lower extremities to direct testing.     Station and gait are normal.  Neurologic:     Muscle tone is normal throughout.     Coordination is intact.     Sensation is intact to light touch throughout.     Patient is oriented to person, place, and time.       Independent review of radiographic imaging: Plain films of the lumbar spine demonstrate intact hardware L4-5. Degenerative disc at L5-S1    Assessment & Plan   Diagnosis: L4-5 spondylolisthesis with instability and stenosis status post decompression with fusion and stabilization    Medical Decision Making: Ms Bañuelos is doing well. I reviewed plain films in the room with her. Hardware looks good. I am pleased with her progress. She will follow up on an as needed basis.         Diagnoses and all orders for this visit:    1. Spinal stenosis of lumbar region with neurogenic claudication (Primary)    2. " Spondylolisthesis of lumbar region    3. Left leg pain    4. DDD (degenerative disc disease), lumbar                                    Rukhsana Zelaya PA-C  Patient Care Team:  Jelani Dahl MD as PCP - General (Physician Assistant)  Edgar Mcguire MD as Consulting Physician (Neurosurgery)

## 2025-04-10 PROBLEM — I73.9 PVD (PERIPHERAL VASCULAR DISEASE) WITH CLAUDICATION: Status: ACTIVE | Noted: 2025-04-10

## 2025-04-10 NOTE — PROGRESS NOTES
"     [facility] Cardiothoracic Surgery New Patient Office Note     Date of Encounter: 2025     Name: Refugio Bañuelos  : 1955     Referred By: Argelia Araiza PA-C  PCP: Jelani Dahl MD    Chief Complaint:    Chief Complaint   Patient presents with    Consult     NP per Argelia Latham PA-C for PAD-complains of bilateral lower extremity claudication when walking long distance       Subjective      History of Present Illness:    Refugio Bañuelos is a 69 y.o. female new patient referred to Dr. Blair per Radiology, Argelia Araiza PA-C for PVD.  PMH: Spinal stenosis s/p lumbar fusion L4-L5 2023, degenerative disc disease, hypertension, hypothyroid, dyslipidemia, active tobacco use, and PVD.  Patient was seen for Interventional Radiology procedure at [facility] 3/24/2025.  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  This occlusion extended into the bilateral common iliac arteries with reconstitution and opacification of the external iliac arteries via the patent bilateral internal iliac arteries.  Percutaneous intervention was attempted but unsuccessful.  Patient has been referred to vascular surgery for consideration of aortogram vs aorta-bifem bypass.  Current symptoms include no lower wounds or ulcerations.  Reports bilateral claudication (thighs and lower legs) she describes as \"weak and tingling\" when walking up two flights of stairs or \"walking a long way\".  Symptoms resolve after rest.  Reports symptoms have been occurring \"for years\".      Review of Systems:  Review of Systems   Constitutional: Negative for chills, decreased appetite, diaphoresis, fever, malaise/fatigue, night sweats, weight gain and weight loss.   HENT:  Negative for hoarse voice.    Eyes:  Negative for blurred vision, double vision and visual disturbance.   Cardiovascular:  Positive for claudication (bilateral). Negative for chest " pain, dyspnea on exertion, irregular heartbeat, leg swelling, near-syncope, orthopnea, palpitations, paroxysmal nocturnal dyspnea and syncope.   Respiratory:  Negative for cough, hemoptysis, shortness of breath, sputum production and wheezing.    Hematologic/Lymphatic: Negative for adenopathy and bleeding problem. Bruises/bleeds easily.   Skin:  Negative for color change, nail changes, poor wound healing and rash.   Musculoskeletal:  Positive for back pain, joint pain and muscle cramps (bilateral muscle cramps occasionally). Negative for falls.   Gastrointestinal:  Negative for abdominal pain, dysphagia and heartburn.   Genitourinary:  Negative for flank pain.   Neurological:  Negative for brief paralysis, disturbances in coordination, dizziness, focal weakness, headaches, light-headedness, loss of balance, numbness, paresthesias, sensory change, vertigo and weakness.   Psychiatric/Behavioral:  Negative for depression and suicidal ideas.    Allergic/Immunologic: Negative for persistent infections.       I have reviewed the following portions of the patient's history: problem list, current medications, allergies, past surgical history, past medical history, past social history, past family history, and ROS and confirm it's accurate.    Allergies:  Allergies   Allergen Reactions    Penicillins Swelling       Medications:      Current Outpatient Medications:     aspirin 81 MG EC tablet, Take 1 tablet by mouth Daily., Disp: , Rfl:     levothyroxine (SYNTHROID, LEVOTHROID) 88 MCG tablet, Take 1 tablet by mouth Daily., Disp: , Rfl:     lisinopril-hydrochlorothiazide (PRINZIDE,ZESTORETIC) 20-12.5 MG per tablet, Take 1 tablet by mouth Every 12 (Twelve) Hours., Disp: , Rfl:     multivitamin with minerals tablet tablet, Take 1 tablet by mouth Daily., Disp: , Rfl:     naproxen sodium (ALEVE) 220 MG tablet, Take 1 tablet by mouth 2 (Two) Times a Day As Needed., Disp: , Rfl:     rosuvastatin (CRESTOR) 10 MG tablet, Take 1 tablet  "by mouth Every Night., Disp: , Rfl:     History:   Past Medical History:   Diagnosis Date    Arthritis     Elevated cholesterol     Hypertension     Low back pain     Peripheral vascular disease     PONV (postoperative nausea and vomiting)     Spinal headache     Thyroid disease        Past Surgical History:   Procedure Laterality Date    APPENDECTOMY      BACK SURGERY      CARPAL TUNNEL RELEASE Bilateral      SECTION      COLONOSCOPY  2023    22 polyps removed per pt    LAPAROSCOPIC TUBAL LIGATION      LUMBAR LAMINECTOMY DISCECTOMY DECOMPRESSION N/A 2023    Procedure: LUMBAR FUSION DECOMPRESSON WITH PEDICLE SCREWS L4-5;  Surgeon: Edgar Mcguire MD;  Location: [facility];  Service: Neurosurgery;  Laterality: N/A;    LUMBAR SPINE SURGERY      L5/S1 decompression, Dr. Clarke [facility]    SPINAL FUSION         Social History     Socioeconomic History    Marital status:     Number of children: 1   Tobacco Use    Smoking status: Every Day     Current packs/day: 0.50     Average packs/day: 0.5 packs/day for 20.0 years (10.0 ttl pk-yrs)     Types: Cigarettes    Smokeless tobacco: Never   Vaping Use    Vaping status: Never Used   Substance and Sexual Activity    Alcohol use: Never    Drug use: Never    Sexual activity: Yes     Partners: Male        Family History   Problem Relation Age of Onset    Thyroid disease Mother     Kidney disease Mother     Hypertension Mother     Arthritis Mother     Heart disease Mother     Heart disease Father        Objective   Physical Exam:  Vitals:    25 0829 25 0830   BP: 130/70 128/70   BP Location: Left arm Right arm   Patient Position: Sitting Sitting   Pulse: 69    Temp: 97.4 °F (36.3 °C)    SpO2: 98%    Weight: 83.5 kg (184 lb)    Height: 160 cm (63\")  Comment: patient reported       Body mass index is 32.59 kg/m².    Physical Exam  Vitals reviewed.   Constitutional:       General: She is not in acute distress.     Appearance: She " is not toxic-appearing.   HENT:      Head: Normocephalic and atraumatic.   Eyes:      General: Lids are normal.      Conjunctiva/sclera: Conjunctivae normal.      Pupils: Pupils are equal, round, and reactive to light.   Neck:      Vascular: No carotid bruit or JVD.   Cardiovascular:      Rate and Rhythm: Normal rate and regular rhythm.      Pulses:           Carotid pulses are 2+ on the right side and 2+ on the left side.       Radial pulses are 2+ on the right side and 2+ on the left side.        Femoral pulses are 1+ on the right side and 1+ on the left side.       Dorsalis pedis pulses are detected w/ Doppler on the right side and detected w/ Doppler on the left side.        Posterior tibial pulses are detected w/ Doppler on the right side and detected w/ Doppler on the left side.      Heart sounds: S1 normal and S2 normal. No murmur heard.     Comments: Loud biphasic pedal pulses per doppler.  Femoral pulses faintly palpable +1  Pulmonary:      Effort: Pulmonary effort is normal. No respiratory distress.      Breath sounds: Normal breath sounds.   Musculoskeletal:         General: Normal range of motion.      Cervical back: Normal range of motion and neck supple.      Right lower leg: No edema.      Left lower leg: No edema.   Feet:      Right foot:      Skin integrity: Skin integrity normal. No ulcer or skin breakdown.      Toenail Condition: Right toenails are normal.      Left foot:      Skin integrity: Skin integrity normal. No ulcer or skin breakdown.      Toenail Condition: Left toenails are normal.      Comments: Toes appear dusky colored but both feet are warm  Skin:     General: Skin is warm and dry.      Capillary Refill: Capillary refill takes less than 2 seconds.   Neurological:      General: No focal deficit present.      Mental Status: She is alert and oriented to person, place, and time.   Psychiatric:         Attention and Perception: Attention normal.         Mood and Affect: Mood normal.          Speech: Speech normal.         Behavior: Behavior is cooperative.         Cognition and Memory: Cognition normal.         Judgment: Judgment normal.         Imaging/Labs:          CTA aorta w bilateral lower extremity runoff 2/7/2025 @ [facility] (Personally reviewed and agree w/ Radiology assessment)  Impression:  1.  Total occlusion distal abdominal aorta just proximal to the bifurcation.  Occlusion extending into the bilateral common iliac arteries.  There is reconstitution opacification of the external iliac arteries by the patent bilateral internal iliac arteries.  2.  No further significant peripheral arterial disease with the remaining lower extremity arteries patent with three-vessel runoff to the feet.  3.  Incidental bilateral adrenal gland hyperplasia without focal masses on this nondedicated imaging.  Clinical correlation recommended  Electronically signed by Humble Castillo MD 2/12/2025    Assessment / Plan      Assessment / Plan:  1. PVD (peripheral vascular disease) with claudication   - 69 y.o. female new patient referred to Dr. Blair per Radiology, Argelia Araiza PA-C for PVD.    - PMH: Spinal stenosis s/p lumbar fusion L4-L5 7/26/2023, degenerative disc disease, hypertension, hypothyroid, dyslipidemia, active tobacco use, and PVD.    - Seen for Interventional Radiology procedure at [facility] 3/24/2025.  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    - Percutaneous intervention was attempted but unsuccessful per NORA Barrios/ Dr. Humble Castillo  - Referred to vascular surgery for consideration of aortogram vs aorta-bifem bypass.   - Current symptoms include no lower wounds or ulcerations.  Reports bilateral claudication (thighs  "and lower legs) she describes as \"weak and tingling\" when walking up two flights of stairs or \"walking a long way\".  Symptoms resolve after rest.  Reports symptoms have been occurring \"for years\".    - Discussed procedure details of aortogram w/ possible aortic stent/iliac stenting, post op expectations, and procedure risks including death, MI, CVA, renal dysfunction r/t contrast dye, pain, bleeding, infection, limb loss, or arterial injury which may require open repair.    - Ms. Bañuelos is a reasonable candidate for repeat aortogram with possible aortic stent/iliac stent per Dr. Blair.  She understand she will meet Dr. Blair in pre-op.    - If repeat aortogram/ intervention procedure is unsuccessful, discussed aorta-bifem bypass will be pursued at a later date  - Discussed the imperative nature of smoking cessation    Patient Education: Risks, benefits, and alternatives were presented to the patient and through shared decision making, the patient elects to proceed with aortogram with possible aortic/iliac artery stenting per Dr. Blair.      Patient Education: Refugio Bañuelos  reports that she has been smoking cigarettes. She has a 10 pack-year smoking history. She has never used smokeless tobacco. I have educated her on the risk of diseases from using tobacco products such as cancer, COPD, heart disease, and arterial disease. I advised her to quit and she is willing to quit. We have discussed the following method/s for tobacco cessation:  [place].  Together we have set a quit date for  no quit date selected .  I spent 4 minutes counseling the patient.        Follow Up:   Return in about 4 weeks (around 5/9/2025) for Post op aortogram w/ possible aortic/iliac artery stenting per Dr. Blair.   Or sooner for any further concerns or worsening sign and symptoms. If unable to reach us in the office please dial 911 or go to the nearest emergency department.      RHIANNA Hubbard  [facility] Cardiothoracic " Surgery    Time Spent: I spent 45 minutes caring for Refugio on this date of service. This time includes time spent by me in the following activities: preparing for the visit, reviewing tests, obtaining and/or reviewing a separately obtained history, performing a medically appropriate examination and/or evaluation, counseling and educating the patient/family/caregiver, ordering medications, tests, or procedures, referring and communicating with other health care professionals, documenting information in the medical record, independently interpreting results and communicating that information with the patient/family/caregiver, and care coordination.

## 2025-04-11 ENCOUNTER — OFFICE VISIT (OUTPATIENT)
Dept: CARDIAC SURGERY | Facility: CLINIC | Age: 70
End: 2025-04-11
Payer: MEDICARE

## 2025-04-11 VITALS
SYSTOLIC BLOOD PRESSURE: 128 MMHG | HEART RATE: 69 BPM | OXYGEN SATURATION: 98 % | WEIGHT: 184 LBS | DIASTOLIC BLOOD PRESSURE: 70 MMHG | BODY MASS INDEX: 32.6 KG/M2 | TEMPERATURE: 97.4 F | HEIGHT: 63 IN

## 2025-04-11 DIAGNOSIS — I73.9 PVD (PERIPHERAL VASCULAR DISEASE) WITH CLAUDICATION: Primary | ICD-10-CM

## 2025-04-11 RX ORDER — ASPIRIN 81 MG/1
81 TABLET ORAL DAILY
COMMUNITY

## 2025-04-16 DIAGNOSIS — I73.9 PVD (PERIPHERAL VASCULAR DISEASE) WITH CLAUDICATION: Primary | ICD-10-CM

## 2025-04-22 ENCOUNTER — PREP FOR SURGERY (OUTPATIENT)
Dept: CARDIAC SURGERY | Facility: CLINIC | Age: 70
End: 2025-04-22
Payer: MEDICARE

## 2025-04-22 DIAGNOSIS — I73.9 PVD (PERIPHERAL VASCULAR DISEASE): Primary | ICD-10-CM

## 2025-04-22 RX ORDER — CHLORHEXIDINE GLUCONATE 500 MG/1
1 CLOTH TOPICAL EVERY 12 HOURS PRN
OUTPATIENT
Start: 2025-05-27

## 2025-05-27 ENCOUNTER — HOSPITAL ENCOUNTER (OUTPATIENT)
Dept: GENERAL RADIOLOGY | Facility: HOSPITAL | Age: 70
Discharge: HOME | End: 2025-05-27
Payer: MEDICARE

## 2025-05-27 ENCOUNTER — PRE-ADMISSION TESTING (OUTPATIENT)
Dept: PREADMISSION TESTING | Facility: HOSPITAL | Age: 70
End: 2025-05-27
Payer: MEDICARE

## 2025-05-27 VITALS — HEIGHT: 63 IN | WEIGHT: 180.78 LBS | BODY MASS INDEX: 32.03 KG/M2

## 2025-05-27 DIAGNOSIS — I73.9 PVD (PERIPHERAL VASCULAR DISEASE): ICD-10-CM

## 2025-05-27 LAB
ABO GROUP BLD: NORMAL
ANION GAP SERPL CALCULATED.3IONS-SCNC: 13 MMOL/L (ref 5–15)
APTT PPP: 25.1 SECONDS (ref 22–39)
BLD GP AB SCN SERPL QL: NEGATIVE
BUN SERPL-MCNC: 28 MG/DL (ref 8–23)
BUN/CREAT SERPL: 18.3 (ref 7–25)
CALCIUM SPEC-SCNC: 9.9 MG/DL (ref 8.6–10.5)
CHLORIDE SERPL-SCNC: 103 MMOL/L (ref 98–107)
CO2 SERPL-SCNC: 22 MMOL/L (ref 22–29)
CREAT SERPL-MCNC: 1.53 MG/DL (ref 0.57–1)
DEPRECATED RDW RBC AUTO: 51.7 FL (ref 37–54)
EGFRCR SERPLBLD CKD-EPI 2021: 37.1 ML/MIN/1.73
ERYTHROCYTE [DISTWIDTH] IN BLOOD BY AUTOMATED COUNT: 14.9 % (ref 12.3–15.4)
GLUCOSE SERPL-MCNC: 109 MG/DL (ref 65–99)
HBA1C MFR BLD: 6.1 % (ref 4.8–5.6)
HCT VFR BLD AUTO: 35.5 % (ref 34–46.6)
HGB BLD-MCNC: 11.8 G/DL (ref 12–15.9)
INR PPP: 0.94 (ref 0.89–1.12)
MCH RBC QN AUTO: 31.3 PG (ref 26.6–33)
MCHC RBC AUTO-ENTMCNC: 33.2 G/DL (ref 31.5–35.7)
MCV RBC AUTO: 94.2 FL (ref 79–97)
PLATELET # BLD AUTO: 298 10*3/MM3 (ref 140–450)
PMV BLD AUTO: 9.6 FL (ref 6–12)
POTASSIUM SERPL-SCNC: 4.2 MMOL/L (ref 3.5–5.2)
PROTHROMBIN TIME: 13.2 SECONDS (ref 12.2–15.3)
QT INTERVAL: 418 MS
QTC INTERVAL: 418 MS
RBC # BLD AUTO: 3.77 10*6/MM3 (ref 3.77–5.28)
RH BLD: NEGATIVE
SODIUM SERPL-SCNC: 138 MMOL/L (ref 136–145)
T&S EXPIRATION DATE: NORMAL
WBC NRBC COR # BLD AUTO: 8.77 10*3/MM3 (ref 3.4–10.8)

## 2025-05-27 PROCEDURE — 86900 BLOOD TYPING SEROLOGIC ABO: CPT

## 2025-05-27 PROCEDURE — 86901 BLOOD TYPING SEROLOGIC RH(D): CPT

## 2025-05-27 PROCEDURE — 86923 COMPATIBILITY TEST ELECTRIC: CPT

## 2025-05-27 PROCEDURE — 85730 THROMBOPLASTIN TIME PARTIAL: CPT

## 2025-05-27 PROCEDURE — 36415 COLL VENOUS BLD VENIPUNCTURE: CPT

## 2025-05-27 PROCEDURE — 93005 ELECTROCARDIOGRAM TRACING: CPT

## 2025-05-27 PROCEDURE — 71046 X-RAY EXAM CHEST 2 VIEWS: CPT

## 2025-05-27 PROCEDURE — 86850 RBC ANTIBODY SCREEN: CPT

## 2025-05-27 PROCEDURE — 80048 BASIC METABOLIC PNL TOTAL CA: CPT

## 2025-05-27 PROCEDURE — 85027 COMPLETE CBC AUTOMATED: CPT

## 2025-05-27 PROCEDURE — 83036 HEMOGLOBIN GLYCOSYLATED A1C: CPT

## 2025-05-27 PROCEDURE — 85610 PROTHROMBIN TIME: CPT

## 2025-05-27 RX ORDER — CHLORHEXIDINE GLUCONATE 500 MG/1
1 CLOTH TOPICAL EVERY 12 HOURS PRN
Status: ACTIVE | OUTPATIENT
Start: 2025-05-27

## 2025-05-27 NOTE — PAT
An arrival time for procedure was not provided during PAT visit. If patient had any questions or concerns about their arrival time, they were instructed to contact their surgeon/physician.  Additionally, if the patient referred to an arrival time that was acquired from their my chart account, patient was encouraged to verify that time with their surgeon/physician. Arrival times are NOT provided in Pre Admission Testing Department.    Patient denies any current skin issues.     Per Anesthesia Request, patient instructed not to take their ACE/ARB medications on the AM of surgery.    Patient to apply Chlorhexadine wipes  to surgical area (as instructed) the night before procedure and the AM of procedure. Wipes provided.    Patient viewed general PAT education video as instructed in their preoperative information received from their surgeon.  Patient stated the general PAT education video was viewed in its entirety and survey completed.  Copies of PAT general education handouts (Incentive Spirometry, Meds to Beds Program, Patient Belongings, Pre-op skin preparation instructions, Blood Glucose testing, Visitor policy, Surgery FAQ, Code H) distributed to patient if not printed. Education related to the PAT pass and skin preparation for surgery (if applicable) completed in PAT as a reinforcement to PAT education video. Patient instructed to return PAT pass provided today as well as completed skin preparation sheet (if applicable) on the day of procedure.     Additionally if patient had not viewed video yet but intended to view it at home or in our waiting area, then referred them to the handout with QR code/link provided during PAT visit.  Encouraged patient/family to read PAT general education handouts thoroughly and notify PAT staff with any questions or concerns. Patient verbalized understanding of all information and priority content.    Blood bank bracelet applied to patient during Pre Admission Testing visit.   Patient instructed not to remove from arm until after procedure and they are discharged from the hospital.  Explained to patient that they may shower and get the bracelet wet, but not to immerse under water for longer periods (bathing, swimming, hand dishwashing, etc).  Patient verbalized understanding.    Patient directed to Radiology Department for CXR after Pre Admission Testing Appointment.

## 2025-05-29 ENCOUNTER — HOSPITAL ENCOUNTER (INPATIENT)
Facility: HOSPITAL | Age: 70
LOS: 1 days | Discharge: HOME | End: 2025-05-30
Attending: THORACIC SURGERY (CARDIOTHORACIC VASCULAR SURGERY) | Admitting: THORACIC SURGERY (CARDIOTHORACIC VASCULAR SURGERY)
Payer: MEDICARE

## 2025-05-29 ENCOUNTER — ANESTHESIA EVENT (OUTPATIENT)
Dept: PERIOP | Facility: HOSPITAL | Age: 70
End: 2025-05-29
Payer: MEDICARE

## 2025-05-29 ENCOUNTER — APPOINTMENT (OUTPATIENT)
Dept: GENERAL RADIOLOGY | Facility: HOSPITAL | Age: 70
End: 2025-05-29
Payer: MEDICARE

## 2025-05-29 ENCOUNTER — ANESTHESIA (OUTPATIENT)
Dept: PERIOP | Facility: HOSPITAL | Age: 70
End: 2025-05-29
Payer: MEDICARE

## 2025-05-29 DIAGNOSIS — I73.9 PVD (PERIPHERAL VASCULAR DISEASE): ICD-10-CM

## 2025-05-29 PROBLEM — I70.229 ATHEROSCLEROTIC PERIPHERAL VASCULAR DISEASE WITH REST PAIN: Status: ACTIVE | Noted: 2025-05-29

## 2025-05-29 LAB
ABO GROUP BLD: NORMAL
RH BLD: NEGATIVE

## 2025-05-29 PROCEDURE — C1894 INTRO/SHEATH, NON-LASER: HCPCS | Performed by: THORACIC SURGERY (CARDIOTHORACIC VASCULAR SURGERY)

## 2025-05-29 PROCEDURE — 76937 US GUIDE VASCULAR ACCESS: CPT | Performed by: THORACIC SURGERY (CARDIOTHORACIC VASCULAR SURGERY)

## 2025-05-29 PROCEDURE — 37221 PR REVSC OPN/PRQ ILIAC ART W/STNT PLMT & ANGIOPLSTY: CPT | Performed by: THORACIC SURGERY (CARDIOTHORACIC VASCULAR SURGERY)

## 2025-05-29 PROCEDURE — 25010000002 PROPOFOL 10 MG/ML EMULSION: Performed by: NURSE ANESTHETIST, CERTIFIED REGISTERED

## 2025-05-29 PROCEDURE — 25010000002 PHENYLEPHRINE 10 MG/ML SOLUTION 1 ML VIAL: Performed by: NURSE ANESTHETIST, CERTIFIED REGISTERED

## 2025-05-29 PROCEDURE — C1769 GUIDE WIRE: HCPCS | Performed by: THORACIC SURGERY (CARDIOTHORACIC VASCULAR SURGERY)

## 2025-05-29 PROCEDURE — 25810000003 SODIUM CHLORIDE 0.9 % SOLUTION 250 ML FLEX CONT: Performed by: NURSE ANESTHETIST, CERTIFIED REGISTERED

## 2025-05-29 PROCEDURE — 86900 BLOOD TYPING SEROLOGIC ABO: CPT

## 2025-05-29 PROCEDURE — B41DZZZ FLUOROSCOPY OF AORTA AND BILATERAL LOWER EXTREMITY ARTERIES: ICD-10-PCS | Performed by: THORACIC SURGERY (CARDIOTHORACIC VASCULAR SURGERY)

## 2025-05-29 PROCEDURE — 25010000002 LIDOCAINE PF 1% 1 % SOLUTION: Performed by: NURSE ANESTHETIST, CERTIFIED REGISTERED

## 2025-05-29 PROCEDURE — 25010000002 HEPARIN (PORCINE) PER 1000 UNITS: Performed by: NURSE ANESTHETIST, CERTIFIED REGISTERED

## 2025-05-29 PROCEDURE — C1760 CLOSURE DEV, VASC: HCPCS | Performed by: THORACIC SURGERY (CARDIOTHORACIC VASCULAR SURGERY)

## 2025-05-29 PROCEDURE — 25010000002 FENTANYL CITRATE (PF) 100 MCG/2ML SOLUTION: Performed by: NURSE ANESTHETIST, CERTIFIED REGISTERED

## 2025-05-29 PROCEDURE — 25010000002 VANCOMYCIN HCL 1.25 G RECONSTITUTED SOLUTION 1 EACH VIAL: Performed by: PHYSICIAN ASSISTANT

## 2025-05-29 PROCEDURE — 25010000002 ONDANSETRON PER 1 MG: Performed by: NURSE ANESTHETIST, CERTIFIED REGISTERED

## 2025-05-29 PROCEDURE — 25010000002 DEXAMETHASONE PER 1 MG: Performed by: NURSE ANESTHETIST, CERTIFIED REGISTERED

## 2025-05-29 PROCEDURE — 04703DZ DILATION OF ABDOMINAL AORTA WITH INTRALUMINAL DEVICE, PERCUTANEOUS APPROACH: ICD-10-PCS | Performed by: THORACIC SURGERY (CARDIOTHORACIC VASCULAR SURGERY)

## 2025-05-29 PROCEDURE — C1725 CATH, TRANSLUMIN NON-LASER: HCPCS | Performed by: THORACIC SURGERY (CARDIOTHORACIC VASCULAR SURGERY)

## 2025-05-29 PROCEDURE — C1887 CATHETER, GUIDING: HCPCS | Performed by: THORACIC SURGERY (CARDIOTHORACIC VASCULAR SURGERY)

## 2025-05-29 PROCEDURE — 25010000002 SUGAMMADEX 200 MG/2ML SOLUTION: Performed by: NURSE ANESTHETIST, CERTIFIED REGISTERED

## 2025-05-29 PROCEDURE — 25810000003 LACTATED RINGERS PER 1000 ML: Performed by: ANESTHESIOLOGY

## 2025-05-29 PROCEDURE — 25010000002 HYDROMORPHONE 1 MG/ML SOLUTION

## 2025-05-29 PROCEDURE — 25010000002 HEPARIN (PORCINE) PER 1000 UNITS: Performed by: THORACIC SURGERY (CARDIOTHORACIC VASCULAR SURGERY)

## 2025-05-29 PROCEDURE — C1874 STENT, COATED/COV W/DEL SYS: HCPCS | Performed by: THORACIC SURGERY (CARDIOTHORACIC VASCULAR SURGERY)

## 2025-05-29 PROCEDURE — 25810000003 SODIUM CHLORIDE 0.9 % SOLUTION 250 ML FLEX CONT: Performed by: PHYSICIAN ASSISTANT

## 2025-05-29 PROCEDURE — 25010000002 GLYCOPYRROLATE 1 MG/5ML SOLUTION: Performed by: NURSE ANESTHETIST, CERTIFIED REGISTERED

## 2025-05-29 PROCEDURE — 047D3DZ DILATION OF LEFT COMMON ILIAC ARTERY WITH INTRALUMINAL DEVICE, PERCUTANEOUS APPROACH: ICD-10-PCS | Performed by: THORACIC SURGERY (CARDIOTHORACIC VASCULAR SURGERY)

## 2025-05-29 PROCEDURE — 86901 BLOOD TYPING SEROLOGIC RH(D): CPT

## 2025-05-29 PROCEDURE — 25810000003 LACTATED RINGERS PER 1000 ML: Performed by: NURSE ANESTHETIST, CERTIFIED REGISTERED

## 2025-05-29 PROCEDURE — 047C3DZ DILATION OF RIGHT COMMON ILIAC ARTERY WITH INTRALUMINAL DEVICE, PERCUTANEOUS APPROACH: ICD-10-PCS | Performed by: THORACIC SURGERY (CARDIOTHORACIC VASCULAR SURGERY)

## 2025-05-29 DEVICE — BX2 HEP REDUCED PROFILE BX2 8MMX59MM 8FR 80CM CATH
Type: IMPLANTABLE DEVICE | Site: ARTERY ILIAC | Status: FUNCTIONAL
Brand: GORE VIABAHN VBX BALLOON EXPANDABLE ENDO

## 2025-05-29 RX ORDER — HYDROMORPHONE HYDROCHLORIDE 1 MG/ML
0.5 INJECTION, SOLUTION INTRAMUSCULAR; INTRAVENOUS; SUBCUTANEOUS
Status: DISCONTINUED | OUTPATIENT
Start: 2025-05-29 | End: 2025-05-29 | Stop reason: HOSPADM

## 2025-05-29 RX ORDER — NITROGLYCERIN 0.4 MG/1
0.4 TABLET SUBLINGUAL
Status: DISCONTINUED | OUTPATIENT
Start: 2025-05-29 | End: 2025-05-30 | Stop reason: HOSPADM

## 2025-05-29 RX ORDER — ONDANSETRON 2 MG/ML
INJECTION INTRAMUSCULAR; INTRAVENOUS AS NEEDED
Status: DISCONTINUED | OUTPATIENT
Start: 2025-05-29 | End: 2025-05-29 | Stop reason: SURG

## 2025-05-29 RX ORDER — MORPHINE SULFATE 2 MG/ML
2 INJECTION, SOLUTION INTRAMUSCULAR; INTRAVENOUS
Status: DISCONTINUED | OUTPATIENT
Start: 2025-05-29 | End: 2025-05-30 | Stop reason: HOSPADM

## 2025-05-29 RX ORDER — ONDANSETRON 2 MG/ML
4 INJECTION INTRAMUSCULAR; INTRAVENOUS ONCE AS NEEDED
Status: DISCONTINUED | OUTPATIENT
Start: 2025-05-29 | End: 2025-05-29 | Stop reason: HOSPADM

## 2025-05-29 RX ORDER — FAMOTIDINE 10 MG/ML
20 INJECTION, SOLUTION INTRAVENOUS
Status: COMPLETED | OUTPATIENT
Start: 2025-05-29 | End: 2025-05-29

## 2025-05-29 RX ORDER — SODIUM CHLORIDE 0.9 % (FLUSH) 0.9 %
10 SYRINGE (ML) INJECTION AS NEEDED
Status: DISCONTINUED | OUTPATIENT
Start: 2025-05-29 | End: 2025-05-29 | Stop reason: HOSPADM

## 2025-05-29 RX ORDER — GLYCOPYRROLATE 0.2 MG/ML
INJECTION INTRAMUSCULAR; INTRAVENOUS AS NEEDED
Status: DISCONTINUED | OUTPATIENT
Start: 2025-05-29 | End: 2025-05-29 | Stop reason: SURG

## 2025-05-29 RX ORDER — ROSUVASTATIN CALCIUM 10 MG/1
10 TABLET, COATED ORAL NIGHTLY
Status: DISCONTINUED | OUTPATIENT
Start: 2025-05-29 | End: 2025-05-30 | Stop reason: HOSPADM

## 2025-05-29 RX ORDER — LISINOPRIL 20 MG/1
20 TABLET ORAL
Status: DISCONTINUED | OUTPATIENT
Start: 2025-05-29 | End: 2025-05-30 | Stop reason: HOSPADM

## 2025-05-29 RX ORDER — SODIUM CHLORIDE, SODIUM LACTATE, POTASSIUM CHLORIDE, CALCIUM CHLORIDE 600; 310; 30; 20 MG/100ML; MG/100ML; MG/100ML; MG/100ML
INJECTION, SOLUTION INTRAVENOUS CONTINUOUS PRN
Status: DISCONTINUED | OUTPATIENT
Start: 2025-05-29 | End: 2025-05-29 | Stop reason: SURG

## 2025-05-29 RX ORDER — DEXAMETHASONE SODIUM PHOSPHATE 4 MG/ML
INJECTION, SOLUTION INTRA-ARTICULAR; INTRALESIONAL; INTRAMUSCULAR; INTRAVENOUS; SOFT TISSUE AS NEEDED
Status: DISCONTINUED | OUTPATIENT
Start: 2025-05-29 | End: 2025-05-29 | Stop reason: SURG

## 2025-05-29 RX ORDER — HYDROCHLOROTHIAZIDE 25 MG/1
12.5 TABLET ORAL
Status: DISCONTINUED | OUTPATIENT
Start: 2025-05-29 | End: 2025-05-30 | Stop reason: HOSPADM

## 2025-05-29 RX ORDER — LEVOTHYROXINE SODIUM 88 UG/1
88 TABLET ORAL DAILY
Status: DISCONTINUED | OUTPATIENT
Start: 2025-05-30 | End: 2025-05-30 | Stop reason: HOSPADM

## 2025-05-29 RX ORDER — LIDOCAINE HYDROCHLORIDE 10 MG/ML
0.5 INJECTION, SOLUTION EPIDURAL; INFILTRATION; INTRACAUDAL; PERINEURAL ONCE AS NEEDED
Status: DISCONTINUED | OUTPATIENT
Start: 2025-05-29 | End: 2025-05-29 | Stop reason: HOSPADM

## 2025-05-29 RX ORDER — SODIUM CHLORIDE, SODIUM LACTATE, POTASSIUM CHLORIDE, CALCIUM CHLORIDE 600; 310; 30; 20 MG/100ML; MG/100ML; MG/100ML; MG/100ML
9 INJECTION, SOLUTION INTRAVENOUS CONTINUOUS
Status: ACTIVE | OUTPATIENT
Start: 2025-05-29 | End: 2025-05-29

## 2025-05-29 RX ORDER — LIDOCAINE HYDROCHLORIDE 10 MG/ML
INJECTION, SOLUTION EPIDURAL; INFILTRATION; INTRACAUDAL; PERINEURAL AS NEEDED
Status: DISCONTINUED | OUTPATIENT
Start: 2025-05-29 | End: 2025-05-29 | Stop reason: SURG

## 2025-05-29 RX ORDER — PROPOFOL 10 MG/ML
VIAL (ML) INTRAVENOUS AS NEEDED
Status: DISCONTINUED | OUTPATIENT
Start: 2025-05-29 | End: 2025-05-29 | Stop reason: SURG

## 2025-05-29 RX ORDER — FENTANYL CITRATE 50 UG/ML
50 INJECTION, SOLUTION INTRAMUSCULAR; INTRAVENOUS
Status: DISCONTINUED | OUTPATIENT
Start: 2025-05-29 | End: 2025-05-29 | Stop reason: HOSPADM

## 2025-05-29 RX ORDER — ASPIRIN 81 MG/1
81 TABLET ORAL DAILY
Status: DISCONTINUED | OUTPATIENT
Start: 2025-05-29 | End: 2025-05-30 | Stop reason: HOSPADM

## 2025-05-29 RX ORDER — HEPARIN SODIUM 1000 [USP'U]/ML
INJECTION, SOLUTION INTRAVENOUS; SUBCUTANEOUS AS NEEDED
Status: DISCONTINUED | OUTPATIENT
Start: 2025-05-29 | End: 2025-05-29 | Stop reason: SURG

## 2025-05-29 RX ORDER — HYDROCODONE BITARTRATE AND ACETAMINOPHEN 7.5; 325 MG/1; MG/1
1 TABLET ORAL EVERY 4 HOURS PRN
Refills: 0 | Status: DISCONTINUED | OUTPATIENT
Start: 2025-05-29 | End: 2025-05-30 | Stop reason: HOSPADM

## 2025-05-29 RX ORDER — FENTANYL CITRATE 50 UG/ML
INJECTION, SOLUTION INTRAMUSCULAR; INTRAVENOUS AS NEEDED
Status: DISCONTINUED | OUTPATIENT
Start: 2025-05-29 | End: 2025-05-29 | Stop reason: SURG

## 2025-05-29 RX ORDER — FAMOTIDINE 20 MG/1
20 TABLET, FILM COATED ORAL
Status: COMPLETED | OUTPATIENT
Start: 2025-05-29 | End: 2025-05-29

## 2025-05-29 RX ORDER — ROCURONIUM BROMIDE 10 MG/ML
INJECTION, SOLUTION INTRAVENOUS AS NEEDED
Status: DISCONTINUED | OUTPATIENT
Start: 2025-05-29 | End: 2025-05-29 | Stop reason: SURG

## 2025-05-29 RX ORDER — CLOPIDOGREL BISULFATE 75 MG/1
75 TABLET ORAL DAILY
Status: DISCONTINUED | OUTPATIENT
Start: 2025-05-30 | End: 2025-05-30 | Stop reason: HOSPADM

## 2025-05-29 RX ORDER — SODIUM CHLORIDE 450 MG/100ML
100 INJECTION, SOLUTION INTRAVENOUS CONTINUOUS
Status: ACTIVE | OUTPATIENT
Start: 2025-05-29 | End: 2025-05-30

## 2025-05-29 RX ADMIN — SUGAMMADEX 300 MG: 100 INJECTION, SOLUTION INTRAVENOUS at 14:50

## 2025-05-29 RX ADMIN — HYDROMORPHONE HYDROCHLORIDE 0.5 MG: 1 INJECTION, SOLUTION INTRAMUSCULAR; INTRAVENOUS; SUBCUTANEOUS at 15:47

## 2025-05-29 RX ADMIN — ASPIRIN 81 MG: 81 TABLET, COATED ORAL at 19:49

## 2025-05-29 RX ADMIN — SODIUM CHLORIDE, POTASSIUM CHLORIDE, SODIUM LACTATE AND CALCIUM CHLORIDE: 600; 310; 30; 20 INJECTION, SOLUTION INTRAVENOUS at 13:45

## 2025-05-29 RX ADMIN — LISINOPRIL 20 MG: 20 TABLET ORAL at 19:49

## 2025-05-29 RX ADMIN — LIDOCAINE HYDROCHLORIDE 50 MG: 10 INJECTION, SOLUTION EPIDURAL; INFILTRATION; INTRACAUDAL; PERINEURAL at 13:48

## 2025-05-29 RX ADMIN — DEXAMETHASONE SODIUM PHOSPHATE 4 MG: 4 INJECTION INTRA-ARTICULAR; INTRALESIONAL; INTRAMUSCULAR; INTRAVENOUS; SOFT TISSUE at 13:50

## 2025-05-29 RX ADMIN — HEPARIN SODIUM 5000 UNITS: 1000 INJECTION INTRAVENOUS; SUBCUTANEOUS at 14:15

## 2025-05-29 RX ADMIN — PHENYLEPHRINE HYDROCHLORIDE 0.5 MCG/KG/MIN: 10 INJECTION INTRAVENOUS at 14:00

## 2025-05-29 RX ADMIN — HYDROCHLOROTHIAZIDE 12.5 MG: 25 TABLET ORAL at 19:49

## 2025-05-29 RX ADMIN — FENTANYL CITRATE 100 MCG: 50 INJECTION, SOLUTION INTRAMUSCULAR; INTRAVENOUS at 13:48

## 2025-05-29 RX ADMIN — PROPOFOL 200 MG: 10 INJECTION, EMULSION INTRAVENOUS at 13:48

## 2025-05-29 RX ADMIN — ROCURONIUM 50 MG: 50 INJECTION, SOLUTION INTRAVENOUS at 13:48

## 2025-05-29 RX ADMIN — VANCOMYCIN HYDROCHLORIDE 1250 MG: 1.25 INJECTION, POWDER, LYOPHILIZED, FOR SOLUTION INTRAVENOUS at 11:50

## 2025-05-29 RX ADMIN — SODIUM CHLORIDE 100 ML/HR: 4.5 INJECTION, SOLUTION INTRAVENOUS at 17:25

## 2025-05-29 RX ADMIN — FAMOTIDINE 20 MG: 20 TABLET, FILM COATED ORAL at 11:53

## 2025-05-29 RX ADMIN — GLYCOPYRROLATE 0.2 MG: 1 INJECTION INTRAMUSCULAR; INTRAVENOUS at 14:10

## 2025-05-29 RX ADMIN — HYDROMORPHONE HYDROCHLORIDE 0.5 MG: 1 INJECTION, SOLUTION INTRAMUSCULAR; INTRAVENOUS; SUBCUTANEOUS at 15:56

## 2025-05-29 RX ADMIN — ROCURONIUM 20 MG: 50 INJECTION, SOLUTION INTRAVENOUS at 14:05

## 2025-05-29 RX ADMIN — SODIUM CHLORIDE, POTASSIUM CHLORIDE, SODIUM LACTATE AND CALCIUM CHLORIDE 9 ML: 600; 310; 30; 20 INJECTION, SOLUTION INTRAVENOUS at 11:30

## 2025-05-29 RX ADMIN — ROSUVASTATIN 10 MG: 10 TABLET, FILM COATED ORAL at 19:49

## 2025-05-29 RX ADMIN — ROCURONIUM 20 MG: 50 INJECTION, SOLUTION INTRAVENOUS at 14:42

## 2025-05-29 RX ADMIN — ONDANSETRON 4 MG: 2 INJECTION INTRAMUSCULAR; INTRAVENOUS at 14:50

## 2025-05-29 NOTE — PLAN OF CARE
Goal Outcome Evaluation:                 Outcome Evaluation: Pt A&Ox4. VSS. NSR. 2L NC Admitted today after bilateral groin aortogram with stent placement. Sand bags removed at 1900. Bed rest until 2300. 1/2 NS until 0330 5/30. Purewick in place. Pt currently in bed relaxing with call light in reach.

## 2025-05-29 NOTE — OP NOTE
DATE OF PROCEDURE: 5/29/2025      PREOPERATIVE DIAGNOSES:  1. 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 Ischemic rest pain of the lower extremities  3. Hypertension  4. Hyperlipidemia  5. Active tobacco abuse      POSTOPERATIVE DIAGNOSES:    1. 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 Ischemic rest pain of the lower extremities  3. Hypertension  4. Hyperlipidemia  5. Active tobacco abuse    PROCEDURES PERFORMED:    1. Aortogram  2. Bilateral aorto-common iliac artery stent placement     SURGEON: Robert Blair MD        ASSISTANT: Elijah Gaston PA was responsible for performing the following activities: Retraction and Placing Dressing and their skilled assistance was necessary for the success of this case.      ANESTHESIA: General anesthesia with Colten Piedra CRNA     ESTIMATED BLOOD LOSS:  Less than 25 mL     TOTAL FLUOROSCOPY TIME: 10.48     EXPOSURE: 498 mGy     CONTRAST:  40 mL      INDICATIONS: 69-year-old  female with a history of hypertension, hyperlipidemia, active tobacco abuse and peripheral vascular disease who presented with bilateral lower extremity ischemic rest pain and toe numbness.  The patient was found to have a chronic total occlusion of the distal abdominal aorta and bilateral common iliac arteries on CT scan.  Percutaneous revascularization was attempted by interventional radiology at [facility].  The patient was felt to be a reasonable candidate for aortogram with possible aortoiliac stenting.  I discussed with the patient the possibility that these lesions could not be traversed and she may need future aortobifemoral bypass. The risks and benefits of surgery were discussed with the patient including pain, bleeding, infection, renal failure, myocardial infarction, loss of limb and death. The patient understood these risks and wished to proceed with surgery.       DESCRIPTION OF PROCEDURE:  The patient was taken to the operating room  and placed under general anesthesia.  She was prepped and draped in the usual sterile fashion and a timeout was performed including the patient's name, procedure and antibiotic administration.  My common femoral artery access was obtained over the femoral head using ultrasound and fluoroscopic guidance.  5000 units of heparin was administered systemically.  A 4 German sheath was placed using modified Seldinger technique and wire access of the distal external iliac arteries was obtained with advantage wires.  The left common iliac artery occlusion was traversed with a trailblazer catheter and advantage wire.  The right common iliac artery occlusion was more difficult and required a trailblazer catheter and the backend of an advantage wire to cross the distal occlusive plaque.  This allowed for advancement of the soft end of the advantage wire and trailblazer catheter into the distal abdominal aorta.  An aortogram was performed via pigtail catheter from the left groin revealing complete occlusion of the distal abdominal aorta and a large collateral filling via lumbar arteries to the iliac arteries.  Retrograde filling was present up to the bilateral common iliac artery bifurcations.  The bilateral common iliac arteries were predilated with a 4 mm x 80 mm balloon.  This allowed for placement of an 8 mm x 59 mm [place] Viabahn VBX stent into the distal abdominal aorta extending into the bilateral common iliac arteries.  Placement was above the bilateral common iliac artery bifurcations.  The stents were simultaneously inflated with kissing balloons.  Completion aortogram revealed satisfactory filling of the aorta, common iliac, external iliac, internal iliac arteries and lumbar collateral vessels.  The right common iliac artery stent was near the ostium to the internal iliac artery that continued to fill on angiogram.  The common femoral arteries were then closed with a single Perclose's on each side.  Hemostasis was  excellent and she had bilateral dorsalis pedis and posterior tibial biphasic Doppler signals at the end of the case.  The patient was subsequently transported to the recovery room in stable condition.

## 2025-05-29 NOTE — ANESTHESIA PROCEDURE NOTES
Airway  Reason: elective    Date/Time: 5/29/2025 2:04 PM  Airway not difficult    General Information and Staff    Patient location during procedure: OR    Indications and Patient Condition  Indications for airway management: airway protection    Preoxygenated: yes  MILS not maintained throughout    Mask difficulty assessment: 1 - vent by mask    Final Airway Details    Final airway type: endotracheal airway      Successful airway: ETT  Cuffed: yes   Successful intubation technique: video laryngoscopy  Endotracheal tube insertion site: oral  Blade: Robles  Blade size: 3  ETT size (mm): 7.0  Cormack-Lehane Classification: grade I - full view of glottis  Placement verified by: chest auscultation and capnometry   Measured from: lips  ETT/EBT  to lips (cm): 21  Number of attempts at approach: 1  Assessment: lips, teeth, and gum same as pre-op and atraumatic intubation    Additional Comments  Negative epigastric sounds, Breath sound equal bilaterally with symmetric chest rise and fall

## 2025-05-29 NOTE — ANESTHESIA POSTPROCEDURE EVALUATION
Patient: Refugio Bañuelos    Procedure Summary       Date: 05/29/25 Room / Location:  [facility] /  [facility]    Anesthesia Start: 1345 Anesthesia Stop: 1508    Procedure: AORTAGRAM WITH RUNOFFS, BILATERAL AORTOILIAC STENT PLACEMENT (Groin) Diagnosis:       PVD (peripheral vascular disease) with claudication      (PVD (peripheral vascular disease) with claudication [I73.9])    Surgeons: Robert Blair MD Provider: Ofe Castillo DO    Anesthesia Type: general ASA Status: 3            Anesthesia Type: general    Vitals  Vitals Value Taken Time   BP     Temp     Pulse 85 05/29/25 15:07   Resp     SpO2 98 % 05/29/25 15:07   Vitals shown include unfiled device data.        Post Anesthesia Care and Evaluation    Patient location during evaluation: PACU  Patient participation: complete - patient participated  Level of consciousness: awake and alert  Pain management: adequate    Airway patency: patent  Anesthetic complications: No anesthetic complications  PONV Status: none  Cardiovascular status: hemodynamically stable and acceptable  Respiratory status: nonlabored ventilation, acceptable and nasal cannula  Hydration status: acceptable

## 2025-05-29 NOTE — H&P
"  Pre-Op H&P  Refugio Bañuelos  [id number]  1955      Chief complaint: PVD      Subjective:  Patient is a 69 y.o.female presents for scheduled surgery by Dr. Blair. She anticipates a AORTAGRAM WITH OR WITHOUT RUNOFFS POSSIBLE AORTIC ILIAC STENT  today. She was seen for Interventional Radiology procedure at [facility] 3/24/2025.  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 Percutaneous intervention was attempted but unsuccessful. Current symptoms include no lower wounds or ulcerations. Reports bilateral claudication (thighs and lower legs) she describes as \"weak and tingling\" when walking up two flights of stairs or \"walking a long way\". Symptoms resolve after rest. Reports symptoms have been occurring \"for years\".       Review of Systems:  Constitutional-- No fever, chills or sweats. No fatigue.  CV-- No chest pain, palpitation or syncope. +HTN, HLD  Resp-- No SOB, cough, hemoptysis  Skin--No rashes or lesions      Allergies:   Allergies   Allergen Reactions    Penicillins Swelling         Home Meds:  Medications Prior to Admission   Medication Sig Dispense Refill Last Dose/Taking    aspirin 81 MG EC tablet Take 1 tablet by mouth Daily.   5/28/2025    levothyroxine (SYNTHROID, LEVOTHROID) 88 MCG tablet Take 1 tablet by mouth Daily.   5/29/2025    lisinopril-hydrochlorothiazide (PRINZIDE,ZESTORETIC) 20-12.5 MG per tablet Take 1 tablet by mouth Every 12 (Twelve) Hours.   5/28/2025    multivitamin with minerals tablet tablet Take 1 tablet by mouth Daily.   Past Week    naproxen sodium (ALEVE) 220 MG tablet Take 1 tablet by mouth 2 (Two) Times a Day As Needed.   Past Month    rosuvastatin (CRESTOR) 10 MG tablet Take 1 tablet by mouth Every Night.   " 2025         PMH:   Past Medical History:   Diagnosis Date    Arthritis     Elevated cholesterol     Hypertension     Low back pain     Peripheral vascular disease     PONV (postoperative nausea and vomiting)     Thyroid disease     Wears glasses      PSH:    Past Surgical History:   Procedure Laterality Date    APPENDECTOMY  1982    BACK SURGERY      CARPAL TUNNEL RELEASE Bilateral      SECTION      COLONOSCOPY  2023    22 polyps removed per pt    LAPAROSCOPIC TUBAL LIGATION      LUMBAR LAMINECTOMY DISCECTOMY DECOMPRESSION N/A 2023    Procedure: LUMBAR FUSION DECOMPRESSON WITH PEDICLE SCREWS L4-5;  Surgeon: Edgar Mcguire MD;  Location: [facility];  Service: Neurosurgery;  Laterality: N/A;    LUMBAR SPINE SURGERY      L5/S1 decompression, Dr. Clarke  [facility]    SPINAL FUSION         Immunization History:  Influenza: UTD  Pneumococcal: UTD  Tetanus: UTD    Social History:   Tobacco:   Social History     Tobacco Use   Smoking Status Every Day    Current packs/day: 0.50    Average packs/day: 0.5 packs/day for 20.4 years (10.2 ttl pk-yrs)    Types: Cigarettes    Start date:    Smokeless Tobacco Never   Tobacco Comments    25 - Has been cutting back in preparation for surgery      Alcohol:     Social History     Substance and Sexual Activity   Alcohol Use Never         Physical Exam:/59 (BP Location: Left arm, Patient Position: Lying)   Pulse 60   Temp 98.6 °F (37 °C) (Temporal)   Resp 18   SpO2 99%       General Appearance:    Alert, cooperative, no distress, appears stated age   Head:    Normocephalic, without obvious abnormality, atraumatic   Lungs:     Clear to auscultation bilaterally, respirations unlabored    Heart:   Regular rate and rhythm, S1 and S2 normal    Abdomen:    Soft without tenderness   Extremities:   Extremities normal, atraumatic, no cyanosis or edema   Skin:   Skin color, texture, turgor normal, no rashes or lesions   Neurologic:   Grossly  intact     Results Review:     LABS:  Lab Results   Component Value Date    WBC 8.77 05/27/2025    HGB 11.8 (L) 05/27/2025    HCT 35.5 05/27/2025    MCV 94.2 05/27/2025     05/27/2025    GLUCOSE 109 (H) 05/27/2025    BUN 28 (H) 05/27/2025    CREATININE 1.53 (H) 05/27/2025     05/27/2025    K 4.2 05/27/2025     05/27/2025    CO2 22.0 05/27/2025    CALCIUM 9.9 05/27/2025       RADIOLOGY:  Imaging Results (Last 72 Hours)       ** No results found for the last 72 hours. **            I reviewed the patient's new clinical results.    Cancer Staging (if applicable)  Cancer Patient: __ yes __no __unknown; If yes, clinical stage T:__ N:__M:__, stage group or __N/A      Impression: PVD (peripheral vascular disease) with claudication      Plan: AORTAGRAM WITH OR WITHOUT RUNOFFS POSSIBLE AORTIC ILIAC STENT       Janette Burroughs, RHIANNA   5/29/2025   11:27 EDT

## 2025-05-29 NOTE — ANESTHESIA PREPROCEDURE EVALUATION
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    )  intravenous induction     Anesthetic plan, risks, benefits, and alternatives have been provided, discussed and informed consent has been obtained with: patient.    Plan discussed with CRNA.    CODE STATUS:

## 2025-05-30 VITALS
DIASTOLIC BLOOD PRESSURE: 55 MMHG | HEART RATE: 70 BPM | OXYGEN SATURATION: 100 % | SYSTOLIC BLOOD PRESSURE: 115 MMHG | HEIGHT: 63 IN | RESPIRATION RATE: 18 BRPM | WEIGHT: 159.1 LBS | BODY MASS INDEX: 28.19 KG/M2 | TEMPERATURE: 97.1 F

## 2025-05-30 PROBLEM — I73.9 PAD (PERIPHERAL ARTERY DISEASE): Status: ACTIVE | Noted: 2025-05-30

## 2025-05-30 PROCEDURE — 99238 HOSP IP/OBS DSCHRG MGMT 30/<: CPT

## 2025-05-30 RX ORDER — CLOPIDOGREL BISULFATE 75 MG/1
75 TABLET ORAL DAILY
Qty: 30 TABLET | Refills: 6 | Status: SHIPPED | OUTPATIENT
Start: 2025-05-31

## 2025-05-30 RX ADMIN — CLOPIDOGREL BISULFATE 75 MG: 75 TABLET, FILM COATED ORAL at 08:45

## 2025-05-30 RX ADMIN — HYDROCHLOROTHIAZIDE 12.5 MG: 25 TABLET ORAL at 08:45

## 2025-05-30 RX ADMIN — LEVOTHYROXINE SODIUM 88 MCG: 0.09 TABLET ORAL at 08:45

## 2025-05-30 RX ADMIN — ASPIRIN 81 MG: 81 TABLET, COATED ORAL at 08:45

## 2025-05-30 RX ADMIN — LISINOPRIL 20 MG: 20 TABLET ORAL at 08:46

## 2025-05-30 NOTE — PROGRESS NOTES
[facility] Cardiothoracic Surgery In-Patient Progress Note     LOS: 1 day     POD # 1 s/p bilateral aorto-common iliac artery stent    Subjective  No complaints, no acute events. VSS on room air. Wants to go home.       Objective  Vital Signs  Temp:  [97.3 °F (36.3 °C)-98.6 °F (37 °C)] 97.3 °F (36.3 °C)  Heart Rate:  [52-93] 52  Resp:  [13-18] 16  BP: ()/(40-70) 104/47    Physical Exam:   General Appearance: alert, appears stated age and cooperative   Lungs: clear to auscultation, respirations regular, respirations even, and respirations unlabored   Heart: regular rhythm & normal rate, normal S1, S2, no murmur, no gallop, no rub, and no click   Skin: Incision c/d/I.  No hematoma.  Groin is soft.   Ext: Warm and viable   Pulses: Palpable 2+ DP bilaterally     Results     Results from last 7 days   Lab Units 05/27/25  1027   WBC 10*3/mm3 8.77   HEMOGLOBIN g/dL 11.8*   HEMATOCRIT % 35.5   PLATELETS 10*3/mm3 298     Results from last 7 days   Lab Units 05/27/25  1027   SODIUM mmol/L 138   POTASSIUM mmol/L 4.2   CHLORIDE mmol/L 103   CO2 mmol/L 22.0   BUN mg/dL 28*   CREATININE mg/dL 1.53*   GLUCOSE mg/dL 109*   CALCIUM mg/dL 9.9     Assessment  POD # 1 s/p bilateral aorto-common iliac artery stent      Plan   Ambulate  D/C home later today after ambulation  Asa, Statin, Plavix      Birdie Landis, APRN  05/30/25  07:23 EDT

## 2025-05-30 NOTE — CASE MANAGEMENT/SOCIAL WORK
Case Management Discharge Note      Final Note: Patient has orders for discharge. Spoke with patient at bedside regarding discharge planning. Patient denies use of HH and reports she has a Blood Pressure machine and Grab Bars in the bathroom. Patient indicates having prescription coverage with medications being affordable. Patient lives with her spouse in a single level house with two steps to enter and reports she is indpendent at home. No discharge needs verbalized. Patient plan is to discharge home today via car with family to transport.         Selected Continued Care - Admitted Since 5/29/2025       Destination    No services have been selected for the patient.                Durable Medical Equipment    No services have been selected for the patient.                Dialysis/Infusion    No services have been selected for the patient.                Home Medical Care    No services have been selected for the patient.                Therapy    No services have been selected for the patient.                Community Resources    No services have been selected for the patient.                Community & DME    No services have been selected for the patient.                         Final Discharge Disposition Code: 01 - home or self-care

## 2025-05-30 NOTE — CASE MANAGEMENT/SOCIAL WORK
Discharge Planning Assessment  [facility]     Patient Name: Refugio Bañuelos  MRN: [id number]  Today's Date: 5/30/2025    Admit Date: 5/29/2025    Plan: Home   Discharge Needs Assessment       Row Name 05/30/25 1100       Living Environment    People in Home spouse    Name(s) of People in Home Zak Bañuelos    Current Living Arrangements home    Potentially Unsafe Housing Conditions none    Primary Care Provided by self    Provides Primary Care For no one    Family Caregiver if Needed spouse    Family Caregiver Names Zak Bañuelos    Quality of Family Relationships helpful;involved;supportive    Able to Return to Prior Arrangements yes       Resource/Environmental Concerns    Transportation Concerns none       Transition Planning    Patient/Family Anticipates Transition to home    Patient/Family Anticipated Services at Transition none    Transportation Anticipated family or friend will provide       Discharge Needs Assessment    Readmission Within the Last 30 Days no previous admission in last 30 days    Equipment Currently Used at Home bp cuff;grab bar    Concerns to be Addressed denies needs/concerns at this time;discharge planning    Anticipated Changes Related to Illness none    Equipment Needed After Discharge none    Discharge Coordination/Progress Home                   Discharge Plan       Row Name 05/30/25 1102       Plan    Plan Home    Patient/Family in Agreement with Plan yes    Plan Comments Patient has orders for discharge.   Spoke with patient at bedside regarding discharge planning.  Patient denies use of HH and reports she has a Blood Pressure machine and Grab Bars in the bathroom.  Patient indicates having prescription coverage with medications being affordable.  Patient lives with her spouse in a single level house with two steps to enter and reports she is indpendent at home.  No discharge needs verbalized.  Patient plan is to discharge home today via car with family to transport.    Final  Discharge Disposition Code 01 - home or self-care                    Expected Discharge Date and Time       Expected Discharge Date Expected Discharge Time    May 30, 2025            Demographic Summary       Row Name 05/30/25 1057       General Information    Admission Type inpatient    Arrived From home    Referral Source admission list    Reason for Consult discharge planning    Preferred Language English    General Information Comments Jelani Dahl MD       Contact Information    Permission Granted to Share Info With     Contact Information Comments Zak Bañuelos, spouse  [phone]                   Functional Status       Row Name 05/30/25 1059       Functional Status    Usual Activity Tolerance good    Current Activity Tolerance good       Functional Status, IADL    Medications independent    Meal Preparation independent    Housekeeping independent    Laundry independent    Shopping independent       Employment/    Employment/ Comments Medicare/[employer]                   Psychosocial    No documentation.                  Abuse/Neglect    No documentation.                  Legal    No documentation.                  Substance Abuse    No documentation.                  Patient Forms    No documentation.                     Suzanne Mendoza, RN

## 2025-05-30 NOTE — PLAN OF CARE
Problem: Adult Inpatient Plan of Care  Goal: Absence of Hospital-Acquired Illness or Injury  Intervention: Identify and Manage Fall Risk  Recent Flowsheet Documentation  Taken 5/30/2025 0600 by Maxi Tristan RN  Safety Promotion/Fall Prevention: safety round/check completed  Taken 5/30/2025 0400 by Maxi Tristan RN  Safety Promotion/Fall Prevention: safety round/check completed  Taken 5/30/2025 0200 by Maxi Tristan RN  Safety Promotion/Fall Prevention: safety round/check completed  Taken 5/30/2025 0000 by Maxi Tristan RN  Safety Promotion/Fall Prevention: safety round/check completed  Taken 5/29/2025 2200 by Maxi Tristan RN  Safety Promotion/Fall Prevention: safety round/check completed  Taken 5/29/2025 2000 by Maxi Tristan RN  Safety Promotion/Fall Prevention:   safety round/check completed   nonskid shoes/slippers when out of bed  Intervention: Prevent Skin Injury  Recent Flowsheet Documentation  Taken 5/30/2025 0600 by Maxi Tristan RN  Body Position:   position changed independently   right  Skin Protection: incontinence pads utilized  Taken 5/30/2025 0400 by Maxi Tristan RN  Body Position:   position changed independently   supine  Skin Protection: incontinence pads utilized  Taken 5/30/2025 0200 by Maxi Tristan RN  Body Position:   position changed independently   right  Skin Protection: incontinence pads utilized  Taken 5/30/2025 0000 by Maxi Tristan RN  Body Position:   position changed independently   left  Skin Protection: incontinence pads utilized  Taken 5/29/2025 2200 by Maxi Tristan RN  Body Position: weight shifting  Skin Protection: incontinence pads utilized  Taken 5/29/2025 2000 by Maxi Tristan RN  Body Position: weight shifting  Skin Protection: incontinence pads utilized  Goal: Optimal Comfort and Wellbeing  Intervention: Provide Person-Centered Care  Recent Flowsheet Documentation  Taken 5/29/2025 2000 by Maxi Tristan  RN  Trust Relationship/Rapport:   care explained   reassurance provided   thoughts/feelings acknowledged   Goal Outcome Evaluation:

## 2025-05-31 LAB
BH BB BLOOD EXPIRATION DATE: NORMAL
BH BB BLOOD EXPIRATION DATE: NORMAL
BH BB BLOOD TYPE BARCODE: 600
BH BB BLOOD TYPE BARCODE: 600
BH BB DISPENSE STATUS: NORMAL
BH BB DISPENSE STATUS: NORMAL
BH BB PRODUCT CODE: NORMAL
BH BB PRODUCT CODE: NORMAL
BH BB UNIT NUMBER: NORMAL
BH BB UNIT NUMBER: NORMAL
CROSSMATCH INTERPRETATION: NORMAL
CROSSMATCH INTERPRETATION: NORMAL
UNIT  ABO: NORMAL
UNIT  ABO: NORMAL
UNIT  RH: NORMAL
UNIT  RH: NORMAL

## 2025-06-02 NOTE — DISCHARGE SUMMARY
[facility] Cardiothoracic Surgery  DISCHARGE SUMMARY    Patient Name: Refugio Bañuelos  : 1955  MRN: [id number]    Date of Admission: 2025  8:46 AM  Date of Discharge:  2025  Primary Care Physician: Jelani Dahl MD  Referred By: Robert Blair MD    IP Care Team:  Patient Care Team:  Jelani Dahl MD as PCP - General (Physician Assistant)  Edgar Mcguire MD as Consulting Physician (Neurosurgery)    Consults       No orders found from 2025 to 2025.            Hospital Course     Presenting Problem: PAD    Active Hospital Problems    Diagnosis  POA    **Atherosclerotic peripheral vascular disease with rest pain s/p aortobiiliac stenting 2025 [I70.229]  Unknown    PAD (peripheral artery disease) [I73.9]  Yes    PVD (peripheral vascular disease) with claudication [I73.9]  Unknown      Resolved Hospital Problems   No resolved problems to display.        Procedures Performed:  Procedure(s):  AORTAGRAM WITH RUNOFFS, BILATERAL AORTOILIAC STENT PLACEMENT       Hospital Course:  Refugio Bañuelos is a 69 y.o. female who was admitted to [facility] on 2025 and underwent scheduled aortogram, bilateral aorto-common iliac artery stent placement with Dr. Blair.  She was extubated without complications and was sent to progressive care unit in stable condition.  POD 1: No acute events.  She was weaned off of Cardene drip without issue.  She was discharged home in stable condition.       Discharge Follow Up Recommendations for outpatient labs/diagnostics:  Follow-up with PCP 1 to 2 weeks  Follow-up with CT surgery in 6 weeks        DO NOT REMOVE SUTURES AND/OR STAPLES THIS WILL BE ADDRESSED AT CT SURGERY FOLLOW-UP  If you have any questions or concerns please reach out to our office at [phone]    Day of Discharge     HPI:   Refugio Bañuelos is a 69 y.o. female new patient referred to Dr. Blair per Radiology, Argelia Araiza PA-C for PVD.  PMH:  "Spinal stenosis s/p lumbar fusion L4-L5 7/26/2023, degenerative disc disease, hypertension, hypothyroid, dyslipidemia, active tobacco use, and PVD.  Patient was seen for Interventional Radiology procedure at [facility] 3/24/2025.  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  This occlusion extended into the bilateral common iliac arteries with reconstitution and opacification of the external iliac arteries via the patent bilateral internal iliac arteries.  Percutaneous intervention was attempted but unsuccessful.  Patient has been referred to vascular surgery for consideration of aortogram vs aorta-bifem bypass.  Current symptoms include no lower wounds or ulcerations.  Reports bilateral claudication (thighs and lower legs) she describes as \"weak and tingling\" when walking up two flights of stairs or \"walking a long way\".  Symptoms resolve after rest.  Reports symptoms have been occurring \"for years\".        Vital Signs:    Temp:  [97.3 °F (36.3 °C)-98.6 °F (37 °C)] 97.3 °F (36.3 °C)  Heart Rate:  [52-93] 52  Resp:  [13-18] 16  BP: ()/(40-70) 104/47      Physical Exam:  General Appearance: alert, appears stated age and cooperative              Lungs: clear to auscultation, respirations regular, respirations even, and respirations unlabored              Heart: regular rhythm & normal rate, normal S1, S2, no murmur, no gallop, no rub, and no click              Skin: Incision c/d/I.  No hematoma.  Groin is soft.              Ext: Warm and viable              Pulses: Palpable 2+ DP bilaterally    Pertinent  and/or Most Recent Results     LAB RESULTS:          Lab 05/27/25  1027   WBC 8.77   HEMOGLOBIN 11.8*   HEMATOCRIT 35.5   PLATELETS 298   MCV 94.2   PROTIME 13.2   APTT 25.1         Lab 05/27/25  1027   SODIUM 138   POTASSIUM 4.2   CHLORIDE 103   CO2 22.0   ANION GAP 13.0   BUN 28*   CREATININE 1.53*   EGFR 37.1*   GLUCOSE 109* "   CALCIUM 9.9   HEMOGLOBIN A1C 6.10*             Lab 05/27/25  1027   PROTIME 13.2   INR 0.94             Lab 05/29/25  1127 05/27/25  1027   ABO TYPING A A   RH TYPING Negative Negative   ANTIBODY SCREEN  --  Negative         Brief Urine Lab Results       None          Microbiology Results (last 10 days)       ** No results found for the last 240 hours. **            Chest X-Ray PA & Lateral  Result Date: 5/27/2025  XR CHEST PA AND LATERAL Date of Exam: 5/27/2025 11:07 AM EDT Indication: Pre-Op Cardiac Surgery Comparison: None available. Findings: Lungs are well expanded and clear. No pleural effusion is seen. Cardiomediastinal contours appear within normal limits.     Impression: No acute cardiopulmonary abnormality is identified. Electronically Signed: Beverly De La Paz MD  5/27/2025 2:05 PM EDT  Workstation ID: [id number]            Discharge Details        Discharge Medications        New Medications        Instructions Start Date   clopidogrel 75 MG tablet  Commonly known as: PLAVIX   75 mg, Oral, Daily             Continue These Medications        Instructions Start Date   aspirin 81 MG EC tablet   81 mg, Daily      levothyroxine 88 MCG tablet  Commonly known as: SYNTHROID, LEVOTHROID   1 tablet, Daily      lisinopril-hydrochlorothiazide 20-12.5 MG per tablet  Commonly known as: PRINZIDE,ZESTORETIC   1 tablet, Every 12 Hours Scheduled      multivitamin with minerals tablet tablet   1 tablet, Daily      naproxen sodium 220 MG tablet  Commonly known as: ALEVE   220 mg, 2 Times Daily PRN      rosuvastatin 10 MG tablet  Commonly known as: CRESTOR   10 mg, Nightly               Allergies   Allergen Reactions    Penicillins Swelling         Discharge Disposition:  Home or Self Care    Diet:  Hospital:No active diet order      Activity:  Activity Instructions       Activity as Tolerated      Bathing Restrictions      You may shower    Type of Restriction: Bathing    Bathing Restrictions: No Tub Bath    Driving  Restrictions      Type of Restriction: Driving    Driving Restrictions: No Driving While Taking Narcotics    Lifting Restrictions      Type of Restriction: Lifting    Lifting Restrictions: Lifting Restriction (Indicate Limit)    Weight Limit (Pounds): 10    Length of Lifting Restriction: until seen at next follow-up appointment               CODE STATUS:    Code Status and Medical Interventions: CPR (Attempt to Resuscitate); Full Support   Ordered at: 05/29/25 1515     Code Status (Patient has no pulse and is not breathing):    CPR (Attempt to Resuscitate)     Medical Interventions (Patient has pulse or is breathing):    Full Support       Future Appointments   Date Time Provider Department Center   7/16/2025 12:30 PM Jethro Perez APRN [facility]       Additional Instructions for the Follow-ups that You Need to Schedule       Call MD With Problems / Concerns   As directed      Instructions:   Please call the CT Surgery office with any questions or concerns you may have [phone]    Order Comments: Instructions: Please call the CT Surgery office with any questions or concerns you may have [phone]         Discharge Follow-up with PCP   As directed       Currently Documented PCP:    Jelani Dahl MD    PCP Phone Number:    [phone]     Follow Up Details: Follow-up with your PCP within 1-2 weeks        Discharge Follow-up with Specialty: Cardiothoracic Surgery   As directed      Specialty: Cardiothoracic Surgery   Follow Up Details: Follow-up within 2-4 weeks                Discharge Education:  Wound Care:  Patient educated regarding care of post-operative wounds.  Patient is to wash with plain soap and water and pat dry.  Patient is not to use salves on the incision sites.  Patient instructed regarding the signs and symptoms of infection and when to call the office with any concerns.      RHIANNA Felix  06/02/25  09:49 EDT    Time: I spent 20 minutes on this discharge activity which  included: face-to-face encounter with the patient, reviewing the data in the system, coordination of the care with the nursing staff as well as consultants, documentation, and entering orders.

## 2025-07-16 ENCOUNTER — OFFICE VISIT (OUTPATIENT)
Dept: CARDIAC SURGERY | Facility: CLINIC | Age: 70
End: 2025-07-16
Payer: MEDICARE

## 2025-07-16 VITALS
HEART RATE: 78 BPM | OXYGEN SATURATION: 98 % | SYSTOLIC BLOOD PRESSURE: 127 MMHG | DIASTOLIC BLOOD PRESSURE: 70 MMHG | BODY MASS INDEX: 31.54 KG/M2 | HEIGHT: 63 IN | WEIGHT: 178 LBS | TEMPERATURE: 97.8 F

## 2025-07-16 DIAGNOSIS — I70.229 ATHEROSCLEROTIC PERIPHERAL VASCULAR DISEASE WITH REST PAIN: Primary | ICD-10-CM

## 2025-07-16 NOTE — PROGRESS NOTES
[facility] Cardiothoracic Surgery Office Follow Up Note     Date of Encounter: 2025     Name: Refugio Bañuelos  : 1955     Referred By: No ref. provider found  PCP: Jelani Dahl MD    Chief Complaint:    Chief Complaint   Patient presents with    Peripheral Vascular Disease     Hospital follow up s/p bilateral aorto-common iliac stent placement 25.       Subjective      History of Present Illness:    Refugio Bañuelos is a 70 y.o. female s/p bilateral aorto-common iliac artery stent placement. PMH: Spinal stenosis s/p lumbar fusion L4-L5 2023, degenerative disc disease, hypertension, hypothyroid, dyslipidemia, active tobacco use, and PVD. Patient was seen for Interventional Radiology procedure at [facility] 3/24/2025.  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  This occlusion extended into the bilateral common iliac arteries with reconstitution and opacification of the external iliac arteries via the patent bilateral internal iliac arteries.  Percutaneous intervention was attempted but unsuccessful.  It was sent to our office for consideration of aortogram versus aorto bifemoral bypass.  Patient wants me to proceed with aortogram and stenting.  Hospital course was uncomplicated with discharge on POD #1.  Presents today for scheduled follow-up.  She denies any ER visits or hospitalizations since discharge.  She reports compliance with her medications: Plavix, statin, and aspirin.  She reports significant improvement in her claudication symptoms.  She denies any concerns with her groin.    Review of Systems:  Review of Systems   Constitutional: Negative. Negative for chills, decreased appetite, diaphoresis, fever, malaise/fatigue, night sweats and weight loss.   HENT: Negative.  Negative for congestion, hoarse voice and sore throat.    Cardiovascular: Negative.  Negative for chest pain, dyspnea on  exertion, irregular heartbeat, leg swelling, near-syncope, orthopnea, palpitations, paroxysmal nocturnal dyspnea and syncope.   Respiratory: Negative.  Negative for cough, hemoptysis, shortness of breath, sleep disturbances due to breathing, snoring, sputum production and wheezing.    Hematologic/Lymphatic: Negative for adenopathy and bleeding problem. Bruises/bleeds easily.   Skin: Negative.  Negative for color change, dry skin, itching, poor wound healing and rash.   Musculoskeletal: Negative.  Negative for falls and muscle weakness.   Gastrointestinal: Negative.  Negative for abdominal pain, anorexia, constipation, diarrhea, nausea and vomiting.   Genitourinary: Negative.  Negative for dysuria and frequency.   Neurological: Negative.  Negative for difficulty with concentration, dizziness, headaches, numbness, paresthesias, seizures and weakness.   Psychiatric/Behavioral: Negative.  Negative for altered mental status, depression and memory loss. The patient does not have insomnia and is not nervous/anxious.    Allergic/Immunologic: Negative.  Negative for persistent infections.       I have reviewed the following portions of the patient's history: problem list, current medications, allergies, past surgical history, past medical history, past social history, past family history, and ROS and confirm it's accurate.    Allergies:  Allergies   Allergen Reactions    Penicillins Swelling       Medications:      Current Outpatient Medications:     aspirin 81 MG EC tablet, Take 1 tablet by mouth Daily., Disp: , Rfl:     clopidogrel (PLAVIX) 75 MG tablet, Take 1 tablet by mouth Daily., Disp: 30 tablet, Rfl: 6    levothyroxine (SYNTHROID, LEVOTHROID) 88 MCG tablet, Take 1 tablet by mouth Daily., Disp: , Rfl:     lisinopril-hydrochlorothiazide (PRINZIDE,ZESTORETIC) 20-12.5 MG per tablet, Take 1 tablet by mouth Every 12 (Twelve) Hours., Disp: , Rfl:     multivitamin with minerals tablet tablet, Take 1 tablet by mouth Daily.,  Disp: , Rfl:     naproxen sodium (ALEVE) 220 MG tablet, Take 1 tablet by mouth 2 (Two) Times a Day As Needed., Disp: , Rfl:     rosuvastatin (CRESTOR) 10 MG tablet, Take 1 tablet by mouth Every Night., Disp: , Rfl:   No current facility-administered medications for this visit.    Facility-Administered Medications Ordered in Other Visits:     Chlorhexidine Gluconate Cloth 2 % pads 1 Application, 1 Application, Topical, Q12H PRN, Elijah Gaston PA    History:   Past Medical History:   Diagnosis Date    Arthritis     Elevated cholesterol     Hypertension     Low back pain     Peripheral vascular disease     PONV (postoperative nausea and vomiting)     Thyroid disease     Wears glasses        Past Surgical History:   Procedure Laterality Date    AORTOGRAM N/A 2025    Procedure: AORTAGRAM WITH RUNOFFS, BILATERAL AORTOILIAC STENT PLACEMENT;  Surgeon: Robert Blair MD;  Location: [facility] HYBRID OR;  Service: Vascular;  Laterality: N/A;  498MGY, 10.48, 40 ML     APPENDECTOMY      BACK SURGERY      CARPAL TUNNEL RELEASE Bilateral      SECTION      COLONOSCOPY  2023    22 polyps removed per pt    LAPAROSCOPIC TUBAL LIGATION      LUMBAR LAMINECTOMY DISCECTOMY DECOMPRESSION N/A 2023    Procedure: LUMBAR FUSION DECOMPRESSON WITH PEDICLE SCREWS L4-5;  Surgeon: Edgar Mcguire MD;  Location:  [facility] OR;  Service: Neurosurgery;  Laterality: N/A;    LUMBAR SPINE SURGERY      L5/S1 decompression, Dr. Clarke  [facility]    SPINAL FUSION         Social History     Socioeconomic History    Marital status:     Number of children: 1   Tobacco Use    Smoking status: Every Day     Current packs/day: 0.50     Average packs/day: 0.5 packs/day for 20.5 years (10.3 ttl pk-yrs)     Types: Cigarettes     Start date:     Smokeless tobacco: Never    Tobacco comments:     25 - Has been cutting back in preparation for surgery   Vaping Use    Vaping status: Never Used   Substance and  "Sexual Activity    Alcohol use: Never    Drug use: Never    Sexual activity: Yes     Partners: Male        Family History   Problem Relation Age of Onset    Thyroid disease Mother     Kidney disease Mother     Hypertension Mother     Arthritis Mother     Heart disease Mother     Heart disease Father        Objective   Physical Exam:  Vitals:    07/16/25 1228 07/16/25 1229   BP: 132/77 127/70   BP Location: Right arm Left arm   Patient Position: Sitting Sitting   Pulse: 78    Temp: 97.8 °F (36.6 °C)    SpO2: 98%    Weight: 80.7 kg (178 lb)    Height: 160 cm (63\")  Comment: patient reports       Body mass index is 31.53 kg/m².    Physical Exam  Vitals and nursing note reviewed.   Constitutional:       General: She is not in acute distress.  HENT:      Head: Normocephalic and atraumatic.   Neck:      Vascular: No carotid bruit or JVD.   Cardiovascular:      Rate and Rhythm: Normal rate and regular rhythm.      Pulses:           Radial pulses are 2+ on the right side and 2+ on the left side.        Dorsalis pedis pulses are 2+ on the right side and 2+ on the left side.        Posterior tibial pulses are 2+ on the right side and 2+ on the left side.      Heart sounds: Normal heart sounds. No murmur heard.     Comments: Bilateral groin access sites without bruising, redness, swelling, warmth, tenderness, or pulsatile mass   Pulmonary:      Effort: Pulmonary effort is normal.      Breath sounds: Normal breath sounds.   Musculoskeletal:      Right lower leg: No edema.      Left lower leg: No edema.   Skin:     General: Skin is warm.      Capillary Refill: Capillary refill takes less than 2 seconds.   Neurological:      Mental Status: She is alert.      Gait: Gait is intact.   Psychiatric:         Attention and Perception: Attention normal.         Behavior: Behavior is cooperative.         Imaging/Labs:         REFERRAL/PRE-AUTH MRN - SCAN - CTA AORTA BLE (04/24/2025)         Assessment / Plan    Refugio Bañuelos is a 70 " y.o. female s/p bilateral aorto-common iliac artery stent placement. PMH: Spinal stenosis s/p lumbar fusion L4-L5 7/26/2023, degenerative disc disease, hypertension, hypothyroid, dyslipidemia, active tobacco use, and PVD. Patient was seen for Interventional Radiology procedure at [facility] 3/24/2025.  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  This occlusion extended into the bilateral common iliac arteries with reconstitution and opacification of the external iliac arteries via the patent bilateral internal iliac arteries.  Percutaneous intervention was attempted but unsuccessful.  It was sent to our office for consideration of aortogram versus aorto bifemoral bypass.  Patient wants me to proceed with aortogram and stenting.  Hospital course was uncomplicated with discharge on POD #1.  Presents today for scheduled follow-up.      Assessment / Plan:  1. Atherosclerotic peripheral vascular disease with rest pain s/p aortobiiliac stenting 5/29/2025   Denies ER visits or hospitalizations since discharge  Reports compliance with medications: plavix, statin, and asa  Denies any claudication symptoms or rest pain  Denies concerns with groin:  no evidence of hematoma or pseudoaneurysm.   Bilateral feet warm to touch, DP and PT pulses 2+ bilaterally  She has quit smoking, congratulated patient   Discussed structured walking  Return in 6 months with aorta iliac graft duplex      Patient Education:A structured walking regimen is used to improve the circulation or blood flow in your legs. Recognize that walking may hurt at first - and that is good. In fact, the goal is to walk at a pace that causes mild or moderate pain or tightness in your legs. Pace yourself, stop to rest for a few minutes, but then resume walking. This discomfort triggers your body to improve your circulation. Repeat several times: Walk at a pace that causes mild or  moderate leg pain, then rest, and keep going. Over time, you may find you are able to walk longer with less pain. That is a sign that your blood vessels are recovering. It may take months, so be patient and persistent.        Follow Up:   Return in about 6 months (around 1/16/2026) for aorta iliac graft duplex .   Or sooner for any further concerns or worsening sign and symptoms. If unable to reach us in the office please dial 911 or go to the nearest emergency department.      RHIANNA Edward  [facility] Cardiothoracic Surgery    Time Spent: I spent 27 minutes caring for Refugio on this date of service. This time includes time spent by me in the following activities: preparing for the visit, reviewing tests, obtaining and/or reviewing a separately obtained history, performing a medically appropriate examination and/or evaluation, counseling and educating the patient/family/caregiver, ordering medications, tests, or procedures, documenting information in the medical record, and care coordination.

## (undated) DEVICE — ADHS LIQ MASTISOL 2/3ML

## (undated) DEVICE — RADIFOCUS GLIDEWIRE ADVANTAGE GUIDEWIRE: Brand: GLIDEWIRE ADVANTAGE

## (undated) DEVICE — INFLATION DEVICE: Brand: ENCORE 26

## (undated) DEVICE — GLV SURG PREMIERPRO MIC LTX PF SZ7.5 BRN

## (undated) DEVICE — IRRIGATOR BULB ASEPTO 60CC STRL

## (undated) DEVICE — AVANTI + 4F STD W/GW: Brand: AVANTI

## (undated) DEVICE — CVR HNDL LIGHT RIGID

## (undated) DEVICE — SHEET,DRAPE,40X58,STERILE: Brand: MEDLINE

## (undated) DEVICE — PINNACLE R/O II INTRODUCER SHEATH WITH RADIOPAQUE MARKER: Brand: PINNACLE

## (undated) DEVICE — LN INJ CONTRST FLXCIL HP BR F/M LL W/ADAPT/ROT 1200PSI 48IN

## (undated) DEVICE — JACKSON TABLE POSITIONER KIT: Brand: MEDLINE INDUSTRIES, INC.

## (undated) DEVICE — DRAPE,TOP,102X53,STERILE: Brand: MEDLINE

## (undated) DEVICE — PACK,UNIVERSAL,NO GOWNS: Brand: MEDLINE

## (undated) DEVICE — Device

## (undated) DEVICE — PERCLOSE PROGLIDE™ SUTURE-MEDIATED CLOSURE SYSTEM: Brand: PERCLOSE PROGLIDE™

## (undated) DEVICE — PATIENT RETURN ELECTRODE, SINGLE-USE, CONTACT QUALITY MONITORING, ADULT, WITH 9FT CORD, FOR PATIENTS WEIGING OVER 33LBS. (15KG): Brand: MEGADYNE

## (undated) DEVICE — ANTIBACTERIAL UNDYED BRAIDED (POLYGLACTIN 910), SYNTHETIC ABSORBABLE SUTURE: Brand: COATED VICRYL

## (undated) DEVICE — SHEET, DRAPE, SPLIT, STERILE: Brand: MEDLINE

## (undated) DEVICE — NDL PERC 1PRT THNWALL W/BASEPLT 18G 7CM

## (undated) DEVICE — CATH SUP PROC TRAILBLAZER .035IN 135CM PK/5

## (undated) DEVICE — STRAP POSTN KN/BDY FM 5X72IN DISP

## (undated) DEVICE — PINNACLE INTRODUCER SHEATH: Brand: PINNACLE

## (undated) DEVICE — SPHR MARKR STEALTH STATION

## (undated) DEVICE — PK NEURO DISC 10

## (undated) DEVICE — TOWEL,OR,DSP,ST,BLUE,STD,4/PK,20PK/CS: Brand: MEDLINE

## (undated) DEVICE — DRSNG WND GZ PAD BORDERED 4X8IN STRL

## (undated) DEVICE — SNAP KOVER: Brand: UNBRANDED

## (undated) DEVICE — TRAP,MUCUS SPECIMEN,40CC: Brand: MEDLINE

## (undated) DEVICE — SUT SILK 2/0 PS 18IN 1588H

## (undated) DEVICE — SUT VIC PLS CTD ANTIB BR 3/0 8/18IN 45CM

## (undated) DEVICE — STRIP,CLOSURE,WOUND,MEDI-STRIP,1/2X4: Brand: MEDLINE

## (undated) DEVICE — GLV SURG BIOGELULTRATOUCH POLYISPRN PF LF SZ7 STRL

## (undated) DEVICE — GOWN,REINFORCE,POLY,SIRUS,BREATH SLV,XLG: Brand: MEDLINE

## (undated) DEVICE — GOWN,SIRUS,NONRNF,SETINSLV,XL,20/CS: Brand: MEDLINE

## (undated) DEVICE — KT DRN EVAC WND PVC PCH WTROC RND 10F400

## (undated) DEVICE — GLV SURG PREMIERPRO ORTHO LTX PF SZ8 BRN

## (undated) DEVICE — SUT VIC 0 CTD BR 18IN UNDYE VCP724D

## (undated) DEVICE — PK ANGIO OR 10

## (undated) DEVICE — PCH INST SURG INVISISHIELD 2PCKT

## (undated) DEVICE — CATH GUIDE SOFTVU FLUSH HT PIG .035 4F 65CM

## (undated) DEVICE — DRP CASSET 10 RAY LG 24X40

## (undated) DEVICE — BLANKT WARM UPPR/BDY ARM/OUT 57X196CM

## (undated) DEVICE — GLIDEX™ COATED HYDROPHILIC GUIDEWIRE: Brand: MAGIC TORQUE™

## (undated) DEVICE — TOOL MR8-14MH30 MR8 14CM MATCH 3MM: Brand: MIDAS REX MR8